# Patient Record
Sex: MALE | Race: WHITE | URBAN - METROPOLITAN AREA
[De-identification: names, ages, dates, MRNs, and addresses within clinical notes are randomized per-mention and may not be internally consistent; named-entity substitution may affect disease eponyms.]

---

## 2018-09-25 ENCOUNTER — OFFICE VISIT (OUTPATIENT)
Dept: PRIMARY CARE CLINIC | Age: 33
End: 2018-09-25
Payer: COMMERCIAL

## 2018-09-25 VITALS
WEIGHT: 166 LBS | RESPIRATION RATE: 18 BRPM | DIASTOLIC BLOOD PRESSURE: 91 MMHG | SYSTOLIC BLOOD PRESSURE: 137 MMHG | HEART RATE: 100 BPM | HEIGHT: 68 IN | OXYGEN SATURATION: 99 % | BODY MASS INDEX: 25.16 KG/M2

## 2018-09-25 DIAGNOSIS — I10 BENIGN ESSENTIAL HTN: ICD-10-CM

## 2018-09-25 DIAGNOSIS — F11.93 OPIOID WITHDRAWAL (HCC): ICD-10-CM

## 2018-09-25 DIAGNOSIS — F11.23 OPIOID DEPENDENCE WITH WITHDRAWAL (HCC): ICD-10-CM

## 2018-09-25 DIAGNOSIS — F33.9 EPISODE OF RECURRENT MAJOR DEPRESSIVE DISORDER, UNSPECIFIED DEPRESSION EPISODE SEVERITY (HCC): Primary | ICD-10-CM

## 2018-09-25 DIAGNOSIS — M54.50 CHRONIC MIDLINE LOW BACK PAIN WITHOUT SCIATICA: ICD-10-CM

## 2018-09-25 DIAGNOSIS — F43.10 PTSD (POST-TRAUMATIC STRESS DISORDER): ICD-10-CM

## 2018-09-25 DIAGNOSIS — G89.29 CHRONIC MIDLINE LOW BACK PAIN WITHOUT SCIATICA: ICD-10-CM

## 2018-09-25 PROBLEM — Z81.8: Status: ACTIVE | Noted: 2018-02-13

## 2018-09-25 PROBLEM — K52.9 ACUTE COLITIS: Status: ACTIVE | Noted: 2018-09-25

## 2018-09-25 PROBLEM — F13.10 SEDATIVE, HYPNOTIC OR ANXIOLYTIC ABUSE, CONTINUOUS (HCC): Status: ACTIVE | Noted: 2018-09-15

## 2018-09-25 PROBLEM — F43.12 CHRONIC POST-TRAUMATIC STRESS DISORDER: Status: ACTIVE | Noted: 2018-09-25

## 2018-09-25 PROBLEM — Z91.89 AT RISK FOR SEXUALLY TRANSMITTED DISEASE DUE TO PARTNER WITH HIV: Status: ACTIVE | Noted: 2018-02-13

## 2018-09-25 PROBLEM — Z83.3 FAMILY HISTORY OF DIABETES MELLITUS (DM): Status: ACTIVE | Noted: 2018-02-13

## 2018-09-25 PROBLEM — E87.6 HYPOKALEMIA: Status: ACTIVE | Noted: 2018-09-25

## 2018-09-25 PROBLEM — F41.1 GAD (GENERALIZED ANXIETY DISORDER): Status: ACTIVE | Noted: 2018-02-13

## 2018-09-25 PROBLEM — M25.512 CHRONIC LEFT SHOULDER PAIN: Status: ACTIVE | Noted: 2018-02-13

## 2018-09-25 PROBLEM — F17.213 CIGARETTE NICOTINE DEPENDENCE WITH WITHDRAWAL: Status: ACTIVE | Noted: 2018-09-15

## 2018-09-25 PROBLEM — F33.1 MAJOR DEPRESSIVE DISORDER, RECURRENT EPISODE, MODERATE (HCC): Status: ACTIVE | Noted: 2018-09-25

## 2018-09-25 PROCEDURE — 4004F PT TOBACCO SCREEN RCVD TLK: CPT | Performed by: INTERNAL MEDICINE

## 2018-09-25 PROCEDURE — G8427 DOCREV CUR MEDS BY ELIG CLIN: HCPCS | Performed by: INTERNAL MEDICINE

## 2018-09-25 PROCEDURE — G8419 CALC BMI OUT NRM PARAM NOF/U: HCPCS | Performed by: INTERNAL MEDICINE

## 2018-09-25 PROCEDURE — 99203 OFFICE O/P NEW LOW 30 MIN: CPT | Performed by: INTERNAL MEDICINE

## 2018-09-25 RX ORDER — LORATADINE 10 MG/1
10 CAPSULE, LIQUID FILLED ORAL DAILY
COMMUNITY
End: 2018-09-25 | Stop reason: SDUPTHER

## 2018-09-25 RX ORDER — BUSPIRONE HYDROCHLORIDE 15 MG/1
15 TABLET ORAL 3 TIMES DAILY
COMMUNITY
End: 2021-08-30

## 2018-09-25 RX ORDER — ACETAMINOPHEN 325 MG/1
650 TABLET ORAL EVERY 6 HOURS PRN
COMMUNITY
End: 2018-09-25 | Stop reason: SDUPTHER

## 2018-09-25 RX ORDER — PROMETHAZINE HYDROCHLORIDE 25 MG/1
12.5-25 TABLET ORAL EVERY 6 HOURS PRN
Qty: 12 TABLET | Refills: 0 | Status: SHIPPED | OUTPATIENT
Start: 2018-09-25 | End: 2018-10-02

## 2018-09-25 RX ORDER — IBUPROFEN 200 MG
200 TABLET ORAL EVERY 6 HOURS PRN
COMMUNITY
End: 2018-09-25 | Stop reason: SDUPTHER

## 2018-09-25 RX ORDER — IBUPROFEN 200 MG
200 TABLET ORAL EVERY 6 HOURS PRN
Qty: 120 TABLET | Refills: 2 | Status: SHIPPED | OUTPATIENT
Start: 2018-09-25 | End: 2021-08-30 | Stop reason: ALTCHOICE

## 2018-09-25 RX ORDER — EMTRICITABINE AND TENOFOVIR DISOPROXIL FUMARATE 200; 300 MG/1; MG/1
1 TABLET, FILM COATED ORAL DAILY
Qty: 30 TABLET | Refills: 5 | Status: SHIPPED | OUTPATIENT
Start: 2018-09-25 | End: 2021-08-30

## 2018-09-25 RX ORDER — ACETAMINOPHEN 325 MG/1
650 TABLET ORAL EVERY 6 HOURS PRN
Qty: 120 TABLET | Refills: 3 | Status: SHIPPED | OUTPATIENT
Start: 2018-09-25 | End: 2021-08-30

## 2018-09-25 RX ORDER — LORATADINE 10 MG/1
10 CAPSULE, LIQUID FILLED ORAL DAILY
Qty: 30 CAPSULE | Refills: 5 | Status: SHIPPED | OUTPATIENT
Start: 2018-09-25 | End: 2021-08-30

## 2018-09-25 RX ORDER — PANTOPRAZOLE SODIUM 40 MG/1
40 TABLET, DELAYED RELEASE ORAL DAILY
COMMUNITY
End: 2018-09-25 | Stop reason: SDUPTHER

## 2018-09-25 RX ORDER — TRAZODONE HYDROCHLORIDE 150 MG/1
300 TABLET ORAL
COMMUNITY
Start: 2018-03-13 | End: 2018-09-25 | Stop reason: SDUPTHER

## 2018-09-25 RX ORDER — HYDROXYZINE 50 MG/1
50 TABLET, FILM COATED ORAL 3 TIMES DAILY PRN
COMMUNITY
End: 2022-03-16 | Stop reason: SDUPTHER

## 2018-09-25 RX ORDER — TRAZODONE HYDROCHLORIDE 100 MG/1
100 TABLET ORAL NIGHTLY
COMMUNITY
End: 2021-08-30

## 2018-09-25 RX ORDER — HYDROXYZINE PAMOATE 25 MG/1
25-50 CAPSULE ORAL 3 TIMES DAILY PRN
Qty: 30 CAPSULE | Refills: 3 | Status: SHIPPED | OUTPATIENT
Start: 2018-09-25 | End: 2018-10-09

## 2018-09-25 RX ORDER — EMTRICITABINE AND TENOFOVIR DISOPROXIL FUMARATE 200; 300 MG/1; MG/1
1 TABLET, FILM COATED ORAL
COMMUNITY
Start: 2018-03-21 | End: 2018-09-25 | Stop reason: SDUPTHER

## 2018-09-25 RX ORDER — PANTOPRAZOLE SODIUM 40 MG/1
40 TABLET, DELAYED RELEASE ORAL DAILY
Qty: 30 TABLET | Refills: 5 | Status: SHIPPED | OUTPATIENT
Start: 2018-09-25 | End: 2021-08-30

## 2018-09-25 RX ORDER — TRAZODONE HYDROCHLORIDE 150 MG/1
300 TABLET ORAL NIGHTLY
Qty: 60 TABLET | Refills: 5 | Status: SHIPPED | OUTPATIENT
Start: 2018-09-25 | End: 2021-08-30

## 2018-09-25 ASSESSMENT — ENCOUNTER SYMPTOMS
RESPIRATORY NEGATIVE: 1
EYE DISCHARGE: 0
EYES NEGATIVE: 1

## 2018-09-25 NOTE — PROGRESS NOTES
lb (68 kg)   12/08/12 145 lb (65.8 kg)     BP Readings from Last 3 Encounters:   09/25/18 (!) 137/91   01/16/13 118/84   12/09/12 136/76         Physical Exam   Constitutional: He is oriented to person, place, and time. He appears well-developed and well-nourished. HENT:   Head: Normocephalic and atraumatic. Eyes: Pupils are equal, round, and reactive to light. Conjunctivae and EOM are normal.   Neck: Normal range of motion. Neck supple. Cardiovascular: Normal rate, regular rhythm and normal heart sounds. Pulmonary/Chest: Effort normal and breath sounds normal.   Musculoskeletal: Normal range of motion. Neurological: He is alert and oriented to person, place, and time. Skin: Skin is warm and dry. Psychiatric: He has a normal mood and affect. His behavior is normal. Judgment and thought content normal.       Lab Review   No visits with results within 6 Month(s) from this visit.    Latest known visit with results is:   Admission on 01/16/2013, Discharged on 01/16/2013   Component Date Value    WBC 01/16/2013 9.9     RBC 01/16/2013 5.34     Hemoglobin 01/16/2013 16.0     Hematocrit 01/16/2013 47.5     MCV 01/16/2013 88.9     MCH 01/16/2013 30.0     MCHC 01/16/2013 33.8     RDW 01/16/2013 11.9     Platelets 09/83/7716 295     MPV 01/16/2013 8.2     Segs Relative 01/16/2013 74.8*    Lymphocytes % 01/16/2013 18.2*    Monocytes % 01/16/2013 5.9     Eosinophils % 01/16/2013 0.3     Basophils % 01/16/2013 0.8     GRANULOCYTE ABSOLUTE COU* 01/16/2013 7.4     Lymphocytes # 01/16/2013 1.8     Monocytes # 01/16/2013 0.6     Eosinophils # 01/16/2013 0.0     Basophils # 01/16/2013 0.1     Differential Type 01/16/2013 Auto     Sodium 01/16/2013 140     Potassium 01/16/2013 3.7     Chloride 01/16/2013 109     CO2 01/16/2013 27     Glucose 01/16/2013 91     BUN 01/16/2013 14     CREATININE 01/16/2013 0.9     Calcium 01/16/2013 10.2     GFR, Estimated 01/16/2013 >60     GFR Est,

## 2018-09-25 NOTE — PATIENT INSTRUCTIONS
Use the medications as directed. I am referring you to psychiatry. I am referring you to pain management. You can resume the trazodone 150 mg 2 pills in the evening for sleep. Phenergan for nausea. Continue your other occasions as before. Follow-up with me in 3 months.

## 2021-08-30 ENCOUNTER — OFFICE VISIT (OUTPATIENT)
Dept: PRIMARY CARE CLINIC | Age: 36
End: 2021-08-30
Payer: COMMERCIAL

## 2021-08-30 VITALS
HEART RATE: 84 BPM | DIASTOLIC BLOOD PRESSURE: 88 MMHG | TEMPERATURE: 98.5 F | HEIGHT: 68 IN | WEIGHT: 184.4 LBS | RESPIRATION RATE: 17 BRPM | BODY MASS INDEX: 27.95 KG/M2 | OXYGEN SATURATION: 97 % | SYSTOLIC BLOOD PRESSURE: 122 MMHG

## 2021-08-30 DIAGNOSIS — R00.2 PALPITATION: Primary | ICD-10-CM

## 2021-08-30 DIAGNOSIS — R63.5 WEIGHT GAIN: ICD-10-CM

## 2021-08-30 DIAGNOSIS — F17.219 CIGARETTE NICOTINE DEPENDENCE WITH NICOTINE-INDUCED DISORDER: ICD-10-CM

## 2021-08-30 DIAGNOSIS — F33.1 MAJOR DEPRESSIVE DISORDER, RECURRENT EPISODE, MODERATE (HCC): ICD-10-CM

## 2021-08-30 DIAGNOSIS — R07.2 PRECORDIAL PAIN: ICD-10-CM

## 2021-08-30 DIAGNOSIS — F41.1 GAD (GENERALIZED ANXIETY DISORDER): ICD-10-CM

## 2021-08-30 DIAGNOSIS — R30.0 DYSURIA: ICD-10-CM

## 2021-08-30 PROBLEM — K52.9 ACUTE COLITIS: Status: RESOLVED | Noted: 2018-09-25 | Resolved: 2021-08-30

## 2021-08-30 LAB
BILIRUBIN, POC: NORMAL
BLOOD URINE, POC: NORMAL
CLARITY, POC: NORMAL
COLOR, POC: NORMAL
GLUCOSE URINE, POC: NORMAL
KETONES, POC: NORMAL
LEUKOCYTE EST, POC: NORMAL
NITRITE, POC: NORMAL
PH, POC: 5.5
PROTEIN, POC: NORMAL
SPECIFIC GRAVITY, POC: 1.02
UROBILINOGEN, POC: NORMAL

## 2021-08-30 PROCEDURE — 93000 ELECTROCARDIOGRAM COMPLETE: CPT | Performed by: INTERNAL MEDICINE

## 2021-08-30 PROCEDURE — G8427 DOCREV CUR MEDS BY ELIG CLIN: HCPCS | Performed by: INTERNAL MEDICINE

## 2021-08-30 PROCEDURE — 99204 OFFICE O/P NEW MOD 45 MIN: CPT | Performed by: INTERNAL MEDICINE

## 2021-08-30 PROCEDURE — 81002 URINALYSIS NONAUTO W/O SCOPE: CPT | Performed by: INTERNAL MEDICINE

## 2021-08-30 PROCEDURE — G8419 CALC BMI OUT NRM PARAM NOF/U: HCPCS | Performed by: INTERNAL MEDICINE

## 2021-08-30 PROCEDURE — 4004F PT TOBACCO SCREEN RCVD TLK: CPT | Performed by: INTERNAL MEDICINE

## 2021-08-30 RX ORDER — OMEPRAZOLE 20 MG/1
20 CAPSULE, DELAYED RELEASE ORAL
Qty: 30 CAPSULE | Refills: 0 | Status: SHIPPED | OUTPATIENT
Start: 2021-08-30 | End: 2021-10-07

## 2021-08-30 RX ORDER — ZOLPIDEM TARTRATE 10 MG/1
TABLET ORAL NIGHTLY
COMMUNITY
Start: 2021-08-20 | End: 2022-03-17

## 2021-08-30 RX ORDER — PROPRANOLOL HYDROCHLORIDE 20 MG/1
TABLET ORAL
COMMUNITY
End: 2021-09-15 | Stop reason: ALTCHOICE

## 2021-08-30 RX ORDER — HYDROXYZINE PAMOATE 50 MG/1
CAPSULE ORAL
COMMUNITY
Start: 2021-08-02 | End: 2021-08-30

## 2021-08-30 RX ORDER — ESCITALOPRAM OXALATE 10 MG/1
TABLET ORAL
COMMUNITY
Start: 2021-08-29 | End: 2021-11-10 | Stop reason: ALTCHOICE

## 2021-08-30 RX ORDER — BUPRENORPHINE HYDROCHLORIDE AND NALOXONE HYDROCHLORIDE DIHYDRATE 8; 2 MG/1; MG/1
10 TABLET SUBLINGUAL
COMMUNITY
Start: 2021-08-19

## 2021-08-30 ASSESSMENT — ENCOUNTER SYMPTOMS
RHINORRHEA: 0
SINUS PRESSURE: 0
WHEEZING: 0
BLOOD IN STOOL: 0
COUGH: 0
SORE THROAT: 0
CHEST TIGHTNESS: 0
SINUS PAIN: 0
NAUSEA: 0
EYE PAIN: 0
BACK PAIN: 1
TROUBLE SWALLOWING: 0
VOMITING: 0
EYE DISCHARGE: 0
ABDOMINAL PAIN: 0
SHORTNESS OF BREATH: 1

## 2021-08-30 NOTE — PROGRESS NOTES
2021    Juan Hwang (: 1985) is a 39 y.o. male, here for evaluation of the following medical concerns:    Chief Complaint   Patient presents with    New Patient     depression and addiction, trouble getting out of bed. heart rate fast.        Nothing eaten till 4 pm.    10 pm went to sleep.woke up at 8-8.30.---walked dog--laid bed    Yesterday--cabbage/sausage/peppers    Cookies--water    Decaf coffee/tea    Suboxone since March--took 12 mg ---trying to decrease to 6-8 mg    Depression    Taking medications regularly. Pursue care-since  CNP--Suboxone--Mental Health CNP--helping sleep and depression. on/off mental health meds---not working. Feeling difficult to wake up. Feeling better with less symptoms of depression. Without any side effects from medications. Reminded to keep regular exercise program.  Reminded to keep self relaxation with music or meditation etc.      Smoking    The [address] for Disease Control and Prevention states:    Tobacco use harms every organ of the body which leads to disease and disability. Tobacco use causes cancer, heart disease, stroke, and lung diseases (including emphysema, bronchitis, and chronic airway obstruction). strongly encouraged  to quit using tobacco.   You can decrease your cigarette use by half every 1-2 weeks to quit smoking in 4-8 weeks or so. You can use off and on nicotine gum or lozenges to help quit smoking. Gastroesophageal Reflux Disease:    Watching gastroesophageal reflux diet. Not Taking medications as prescribed without any side effects. Not Working on trying not to eat for 3 to 4 hours before lying down. Palpitations   This is a new problem. The current episode started more than 1 month ago. The problem occurs every several days. The problem has been gradually worsening. The symptoms are aggravated by stress. Associated symptoms include chest fullness, chest pain and shortness of breath.  Pertinent negatives include no coughing, fever, malaise/fatigue, nausea, numbness, vomiting or weakness. He has tried beta blockers for the symptoms. The treatment provided mild relief. Risk factors include smoking/tobacco exposure. His past medical history is significant for drug use. Review of Systems   Constitutional: Positive for fatigue. Negative for appetite change, chills, fever, malaise/fatigue and unexpected weight change. HENT: Negative for congestion, ear discharge, ear pain, nosebleeds, rhinorrhea, sinus pressure, sinus pain, sore throat and trouble swallowing. Eyes: Negative for pain and discharge. Respiratory: Positive for shortness of breath. Negative for cough, chest tightness and wheezing. Cardiovascular: Positive for chest pain and palpitations. Negative for leg swelling. Gastrointestinal: Negative for abdominal pain, blood in stool, nausea and vomiting. Endocrine: Negative for polydipsia and polyphagia. Genitourinary: Negative for difficulty urinating, enuresis, flank pain and hematuria. Musculoskeletal: Positive for back pain. Negative for myalgias. Skin: Negative for rash. Neurological: Negative for facial asymmetry, weakness, light-headedness, numbness and headaches. Psychiatric/Behavioral: Negative for confusion. Current Outpatient Medications on File Prior to Visit   Medication Sig Dispense Refill    buprenorphine-naloxone (SUBOXONE) 8-2 MG SUBL SL tablet       escitalopram (LEXAPRO) 10 MG tablet       propranolol (INDERAL) 20 MG tablet propranolol 20 mg tablet      zolpidem (AMBIEN) 10 MG tablet       hydrOXYzine (ATARAX) 50 MG tablet Take 50 mg by mouth 3 times daily as needed for Itching       No current facility-administered medications on file prior to visit.       No Known Allergies  Past Medical History:   Diagnosis Date    Anxiety     Benign essential HTN 12/18/2015    Episode of recurrent major depressive disorder ([facility] [address] 9/25/2018    Midline low back pain without sciatica 2016    Panic anxiety syndrome     Sedative, hypnotic or anxiolytic abuse, continuous ([facility] [address] 9/15/2018    Sleep disturbances      Past Surgical History:   Procedure Laterality Date    APPENDECTOMY      before 11years of age   [employer] BACK SURGERY      removed fragments of tailbone    TONSILLECTOMY      preteen      Social History     Tobacco Use    Smoking status: Current Every Day Smoker     Packs/day: 1.00     Years: 14.00     Pack years: 14.00    Smokeless tobacco: Never Used   Substance Use Topics    Alcohol use: No      Family History   Problem Relation Age of Onset    Thyroid Disease Mother     Stroke Mother     Hypertension Mother     Other Father     Diabetes Father          at 55-Oxycontin OD        Vitals:    21 1523   BP: 122/88   Site: Left Upper Arm   Position: Sitting   Cuff Size: Medium Adult   Pulse: 84   Resp: 17   Temp: 98.5 °F (36.9 °C)   SpO2: 97%   Weight: 184 lb 6.4 oz (83.6 kg)   Height: 5' 7.5\" (1.715 m)     Estimated body mass index is 28.45 kg/m² as calculated from the following:    Height as of this encounter: 5' 7.5\" (1.715 m). Weight as of this encounter: 184 lb 6.4 oz (83.6 kg). Physical Exam  Vitals and nursing note reviewed. Constitutional:       General: He is not in acute distress. HENT:      Head: Normocephalic and atraumatic. Right Ear: External ear normal.      Left Ear: External ear normal.   Eyes:      General: Lids are normal.      Conjunctiva/sclera: Conjunctivae normal.      Pupils: Pupils are equal, round, and reactive to light. Neck:      Thyroid: No thyromegaly. Vascular: No JVD. Trachea: No tracheal deviation. Cardiovascular:      Rate and Rhythm: Normal rate and regular rhythm. Heart sounds: Normal heart sounds. No gallop. Pulmonary:      Effort: Pulmonary effort is normal. No respiratory distress. Breath sounds: Normal breath sounds. No wheezing or rales.    Abdominal:      General: Bowel sounds are normal.      Palpations: Abdomen is soft. There is no mass. Tenderness: There is no abdominal tenderness. Musculoskeletal:         General: No tenderness. Cervical back: Neck supple. Comments: No leg edema or calf tenderness   Lymphadenopathy:      Cervical: No cervical adenopathy. Skin:     General: Skin is warm and dry. Findings: No rash. Neurological:      General: No focal deficit present. Mental Status: He is alert and oriented to person, place, and time. Cranial Nerves: No cranial nerve deficit. Sensory: No sensory deficit. Psychiatric:         Attention and Perception: Attention and perception normal.         Mood and Affect: Mood is depressed. Speech: Speech normal.         Behavior: Behavior normal.         Thought Content: Thought content normal.         ASSESSMENT/PLAN:  1. Palpitation    - Comprehensive Metabolic Panel; Future  - TSH with Reflex; Future  - T4, Free; Future  - EKG 12 Lead--Sinus --nonsp QRS prolonged  - POCT Urinalysis no Micro  - Holter Monitor 48 Hour; Future  - Bg Buchanan MD, Cardiology, [place] [id number]    2. Precordial pain    - Troponin; Future  - EKG 12 Art Luis MD, Cardiology, [facility]  - omeprazole (PRILOSEC) 20 MG delayed release capsule; Take 1 capsule by mouth every morning (before breakfast)  Dispense: 30 capsule; Refill: 0    3. Major depressive disorder, recurrent episode, moderate ([address]    - External Referral to Psychiatry    4. Cigarette nicotine dependence with nicotine-induced disorder  10 cigs a day  5. NENITA (generalized anxiety disorder)  No caffeine    6. Weight gain  Lose wt    7. Dysuria    - CBC Auto Differential; Future  - POCT Urinalysis no Micro      Return in about 1 week (around 9/6/2021) for labs etc.     Patient Instructions   Blood work tomorrow here . Headspace SARAH--meditation.     Hold off propranolol on the day of the Holter monitor to see the heart rate. 64 oz water from 9am to 6 pm.    No caffeine diet--wean off. Extensive counseling done to keep avoiding spicy, acidic tomato based foods or fatty foods like chocolate, citrus fruits (oranges, grapefruit etc.) and fruit juices. Limit intake of coffee, tea, alcohol and Bambi to one a day after food only. Watch your weight. Being overweight increases intra-abdominal pressure - which can aggravate heartburn or reflux. Don't gorge yourself at meal time. Eat smaller meals. Wait for 2 hours for exercise after eating. No eating or drinking (not even water) for 3-4 hours before lying down. May elevate head of bed with blocks , if needed. If smoking-stop or at least cut down on smoking. Decrease ambien to 5mg     Decrease psych meds to half and see psychiatrist    Regular exercise--1 hour brisk walk     Make Sure to keep same dose of psychiatric medication for 2 months to see the peak benefit. Strongly encouraged to quit using tobacco.   You can decrease your cigarette use by half every 1-2 weeks to quit smoking in 4-8 weeks or so. You can use off and on nicotine gum or lozenges to help quit smoking. The [address] for Disease Control and Prevention states:    Tobacco use harms every organ of the body which leads to disease and disability.  Tobacco use causes cancer, heart disease, stroke, and lung diseases (including emphysema, bronchitis, and chronic airway obstruction). We have many other ways to help you quit using tobacco.     We suggest this website:  www.smokefree. gov   You might also like this cell phone text message program.  Text message charges apply on your cell phone plan. Text the word QUIT to TRAVIS to get started.  This program offers free telephone counseling. Dial 2-800-QUIT-Now    Discussed use, benefit, and side effects of prescribed medications. Barriers to compliance discussed. All patient questions answered. Pt voiced understanding.    IF YOU NEED A PRESCRIPTION REFILL, THEN PLEASE GIVE US THREE WORKING DAYS TO REFILL A PRESCRIPTION. Office Hours to Answer Questions--Monday thru Thursday --9.00 AM to 4.00 PM    Please get all OVER DUE VACCINATIONS done at the pharmacy or health department as soon as possible. Electronically signed by Carly Gomez MD on 8/30/2021 at 4:27 PM     This dictation was generated by voice recognition computer software. Although all attempts are made to edit the dictation for accuracy, there may be errors in the transcription that are not intended.

## 2021-08-30 NOTE — PATIENT INSTRUCTIONS
Blood work tomorrow here . Headspace SARAH--meditation. Hold off propranolol on the day of the Holter monitor to see the heart rate. 64 oz water from 9am to 6 pm.    No caffeine diet--wean off. Extensive counseling done to keep avoiding spicy, acidic tomato based foods or fatty foods like chocolate, citrus fruits (oranges, grapefruit etc.) and fruit juices. Limit intake of coffee, tea, alcohol and Bambi to one a day after food only. Watch your weight. Being overweight increases intra-abdominal pressure - which can aggravate heartburn or reflux. Don't gorge yourself at meal time. Eat smaller meals. Wait for 2 hours for exercise after eating. No eating or drinking (not even water) for 3-4 hours before lying down. May elevate head of bed with blocks , if needed. If smoking-stop or at least cut down on smoking. Decrease ambien to 5mg     Decrease psych meds to half and see psychiatrist    Regular exercise--1 hour brisk walk     Make Sure to keep same dose of psychiatric medication for 2 months to see the peak benefit. Strongly encouraged to quit using tobacco.   You can decrease your cigarette use by half every 1-2 weeks to quit smoking in 4-8 weeks or so. You can use off and on nicotine gum or lozenges to help quit smoking. The [address] for Disease Control and Prevention states:    Tobacco use harms every organ of the body which leads to disease and disability.  Tobacco use causes cancer, heart disease, stroke, and lung diseases (including emphysema, bronchitis, and chronic airway obstruction). We have many other ways to help you quit using tobacco.     We suggest this website:  www.smokefree. gov   You might also like this cell phone text message program.  Text message charges apply on your cell phone plan. Text the word QUIT to IQUIT to get started.  This program offers free telephone counseling.   Dial 1-800-QUIT-Now    Discussed use, benefit, and side effects of prescribed

## 2021-09-01 ENCOUNTER — OFFICE VISIT (OUTPATIENT)
Dept: CARDIOLOGY CLINIC | Age: 36
End: 2021-09-01
Payer: COMMERCIAL

## 2021-09-01 VITALS
HEART RATE: 84 BPM | DIASTOLIC BLOOD PRESSURE: 74 MMHG | WEIGHT: 186 LBS | BODY MASS INDEX: 28.19 KG/M2 | SYSTOLIC BLOOD PRESSURE: 118 MMHG | HEIGHT: 68 IN | OXYGEN SATURATION: 97 %

## 2021-09-01 DIAGNOSIS — R00.2 PALPITATION: Primary | ICD-10-CM

## 2021-09-01 DIAGNOSIS — R07.9 CHEST PAIN, UNSPECIFIED TYPE: ICD-10-CM

## 2021-09-01 DIAGNOSIS — R06.02 SOB (SHORTNESS OF BREATH): ICD-10-CM

## 2021-09-01 PROCEDURE — 93000 ELECTROCARDIOGRAM COMPLETE: CPT | Performed by: INTERNAL MEDICINE

## 2021-09-01 PROCEDURE — 99203 OFFICE O/P NEW LOW 30 MIN: CPT | Performed by: INTERNAL MEDICINE

## 2021-09-01 PROCEDURE — 4004F PT TOBACCO SCREEN RCVD TLK: CPT | Performed by: INTERNAL MEDICINE

## 2021-09-01 PROCEDURE — G8427 DOCREV CUR MEDS BY ELIG CLIN: HCPCS | Performed by: INTERNAL MEDICINE

## 2021-09-01 PROCEDURE — 93242 EXT ECG>48HR<7D RECORDING: CPT | Performed by: INTERNAL MEDICINE

## 2021-09-01 PROCEDURE — G8419 CALC BMI OUT NRM PARAM NOF/U: HCPCS | Performed by: INTERNAL MEDICINE

## 2021-09-01 NOTE — PROGRESS NOTES
[address]   Cardiac Consultation    Referring Provider:  Owen Tellez MD     Chief Complaint   Patient presents with    New Patient     Referral from Dr. Rani Mann Dx palpitations, precordial pain    Hypertension        History of Present Illness:  Linette López is a 39 y.o. male here as a new consult for palpitations. He states he has taken propranolol \"for a long time\", has been taking for his heart rate and BP controll. He does not feel this has been working lately. He reports he will have occasional episodes of chest pain accompanied with elevated heart rate. Had instance where this lasted over a week. Has had no prior cardiac testing beside EKG. He reports issues with palpitations and hypertension for 3-4 years but worsening in the last few months. Past Medical History:   has a past medical history of Anxiety, Benign essential HTN, Episode of recurrent major depressive disorder ([address] Midline low back pain without sciatica, Panic anxiety syndrome, Sedative, hypnotic or anxiolytic abuse, continuous ([address] and Sleep disturbances. Surgical History:   has a past surgical history that includes Appendectomy; Tonsillectomy; and back surgery. Social History:   reports that he has been smoking. He has a 14.00 pack-year smoking history. He has never used smokeless tobacco. He reports that he does not drink alcohol and does not use drugs. Family History:  family history includes Diabetes in his father; Hypertension in his mother; Other in his father; Stroke in his mother; Thyroid Disease in his mother. Home Medications:  Prior to Admission medications    Medication Sig Start Date End Date Taking?  Authorizing Provider   buprenorphine-naloxone (SUBOXONE) 8-2 MG SUBL SL tablet  8/19/21  Yes Historical Provider, MD   escitalopram (LEXAPRO) 10 MG tablet  8/29/21  Yes Historical Provider, MD   propranolol (INDERAL) 20 MG tablet propranolol 20 mg tablet   Yes Historical Provider, MD   zolpidem (AMBIEN) 10 MG tablet  8/20/21  Yes Historical Provider, MD   omeprazole (PRILOSEC) 20 MG delayed release capsule Take 1 capsule by mouth every morning (before breakfast) 8/30/21  Yes Bryan Lucio MD   hydrOXYzine (ATARAX) 50 MG tablet Take 50 mg by mouth 3 times daily as needed for Itching   Yes Historical Provider, MD        Allergies:  Patient has no known allergies. Review of Systems:   · Constitutional: there has been no unanticipated weight loss. There's been no change in energy level, sleep pattern, or activity level. · Eyes: No visual changes or diplopia. No scleral icterus. · ENT: No Headaches, hearing loss or vertigo. No mouth sores or sore throat. · Cardiovascular: Reviewed in HPI  · Respiratory: No cough or wheezing, no sputum production. No hematemesis. · Gastrointestinal: No abdominal pain, appetite loss, blood in stools. No change in bowel or bladder habits. · Genitourinary: No dysuria, trouble voiding, or hematuria. · Musculoskeletal:  No gait disturbance, weakness or joint complaints. · Integumentary: No rash or pruritis. · Neurological: No headache, diplopia, change in muscle strength, numbness or tingling. No change in gait, balance, coordination, mood, affect, memory, mentation, behavior. · Psychiatric: No anxiety, no depression. · Endocrine: No malaise, fatigue or temperature intolerance. No excessive thirst, fluid intake, or urination. No tremor. · Hematologic/Lymphatic: No abnormal bruising or bleeding, blood clots or swollen lymph nodes. · Allergic/Immunologic: No nasal congestion or hives.     Physical Examination:    Vitals:    09/01/21 1022   BP: 118/74   Pulse: 84   SpO2: 97%        Wt Readings from Last 1 Encounters:   09/01/21 186 lb (84.4 kg)       Constitutional and General Appearance: NAD   Skin:good turgor,intact without lesions  HEENT: EOMI ,normal  Neck:no JVD    Respiratory:  · Normal excursion and expansion without use of accessory muscles  · Resp Auscultation: Normal breath sounds without dullness  Cardiovascular:  · The apical impulses not displaced  · Heart tones are crisp and normal  · Cervical veins are not engorged  · The carotid upstroke is normal in amplitude and contour without delay or bruit  · Peripheral pulses are symmetrical and full  · There is no clubbing, cyanosis of the extremities. · No edema  · Femoral Arteries: 2+ and equal  · Pedal Pulses: 2+ and equal   Abdomen:  · No masses or tenderness  · Liver/Spleen: No Abnormalities Noted  Neurological/Psychiatric:  · Alert and oriented in all spheres  · Moves all extremities well  · Exhibits normal gait balance and coordination  · No abnormalities of mood, affect, memory, mentation, or behavior are noted      Assessment/Plan:     Palpitation/Chest pain- STOP propranolol (INDERAL) 20 MG tablet   Start Metoprolol  (lopressor) x2 daily - take this while wearing the monitor       7 day cardiac monitor        Plain stress test and Echo      Hypertension- controlled-Blood pressure 118/74, pulse 84, height 5' 7.5\" (1.715 m), weight 186 lb (84.4 kg), SpO2 97 %. Plan:    Cardiac test and lab results personally reviewed by me during this office visit and discussed. STOP propranolol (INDERAL) 20 MG tablet    Start Metoprolol  (lopressor) x2 daily - take this while wearing the monitor         7 day cardiac monitor         Plain stress test and Echo   Continue risk factor modifications. Call for any change in symptoms, call to report any changes in shortness of breath or development of chest pain with activity. Return for regular follow up based on testing results. I appreciate the opportunity of cooperating in the care of this individual.    Zahra Silva. Shena Royal M.D., [facility]    Patient's problem list, medications, allergies, past medical, surgical, social and family histories were reviewed and updated as appropriate. Scribe's attestation:  This note was scribed in the presence of Dr. Kimberly Shen MD, by Stefany Bell RN. The scribe's documentation has been prepared under my direction and personally reviewed by me in its entirety. I confirm that the note above accurately reflects all work, treatment, procedures, and medical decision making performed by me.

## 2021-09-01 NOTE — PROGRESS NOTES
7 Day Bardy placed on the patient here in clinic today . Reviewed proper care and instructions with the patient .      SN : [id number]

## 2021-09-10 PROCEDURE — 93244 EXT ECG>48HR<7D REV&INTERPJ: CPT | Performed by: INTERNAL MEDICINE

## 2021-09-15 ENCOUNTER — OFFICE VISIT (OUTPATIENT)
Dept: PRIMARY CARE CLINIC | Age: 36
End: 2021-09-15
Payer: COMMERCIAL

## 2021-09-15 VITALS
HEART RATE: 80 BPM | TEMPERATURE: 97 F | BODY MASS INDEX: 28.61 KG/M2 | SYSTOLIC BLOOD PRESSURE: 118 MMHG | WEIGHT: 188.8 LBS | DIASTOLIC BLOOD PRESSURE: 80 MMHG | HEIGHT: 68 IN | OXYGEN SATURATION: 98 % | RESPIRATION RATE: 16 BRPM

## 2021-09-15 DIAGNOSIS — K21.00 GASTROESOPHAGEAL REFLUX DISEASE WITH ESOPHAGITIS WITHOUT HEMORRHAGE: ICD-10-CM

## 2021-09-15 DIAGNOSIS — Z11.59 NEED FOR HEPATITIS C SCREENING TEST: ICD-10-CM

## 2021-09-15 DIAGNOSIS — F17.219 CIGARETTE NICOTINE DEPENDENCE WITH NICOTINE-INDUCED DISORDER: ICD-10-CM

## 2021-09-15 DIAGNOSIS — R73.9 HYPERGLYCEMIA: ICD-10-CM

## 2021-09-15 DIAGNOSIS — Z11.4 SCREENING FOR HIV (HUMAN IMMUNODEFICIENCY VIRUS): ICD-10-CM

## 2021-09-15 DIAGNOSIS — I10 BENIGN ESSENTIAL HTN: Primary | ICD-10-CM

## 2021-09-15 PROCEDURE — G8419 CALC BMI OUT NRM PARAM NOF/U: HCPCS | Performed by: INTERNAL MEDICINE

## 2021-09-15 PROCEDURE — 4004F PT TOBACCO SCREEN RCVD TLK: CPT | Performed by: INTERNAL MEDICINE

## 2021-09-15 PROCEDURE — G8427 DOCREV CUR MEDS BY ELIG CLIN: HCPCS | Performed by: INTERNAL MEDICINE

## 2021-09-15 PROCEDURE — 99214 OFFICE O/P EST MOD 30 MIN: CPT | Performed by: INTERNAL MEDICINE

## 2021-09-15 ASSESSMENT — ENCOUNTER SYMPTOMS
NAUSEA: 0
CHEST TIGHTNESS: 0
SINUS PRESSURE: 0
EYE PAIN: 0
TROUBLE SWALLOWING: 0
SORE THROAT: 0
EYE DISCHARGE: 0
WHEEZING: 0
COUGH: 0
SHORTNESS OF BREATH: 0
ABDOMINAL PAIN: 0
SINUS PAIN: 0
VOMITING: 0
BLOOD IN STOOL: 0
RHINORRHEA: 0

## 2021-09-15 NOTE — PATIENT INSTRUCTIONS
Blood work tomorrow. Keep cardio/psych f/u. No caffeine. All meds after food. Extensive counseling done to keep avoiding spicy, acidic tomato based foods or fatty foods like chocolate, citrus fruits (oranges, grapefruit etc.) and fruit juices. Limit intake of coffee, tea, alcohol and Bambi to one a day after food only. Watch your weight. Being overweight increases intra-abdominal pressure - which can aggravate heartburn or reflux. Don't gorge yourself at meal time. Eat smaller meals. Wait for 2 hours for exercise after eating. No eating or drinking (not even water) for 3-4 hours before lying down. May elevate head of bed with blocks , if needed. If smoking-stop or at least cut down on smoking. Strongly encouraged to quit using tobacco.   You can decrease your cigarette use by half every 1-2 weeks to quit smoking in 4-8 weeks or so. You can use off and on nicotine gum or lozenges to help quit smoking. The [address] for Disease Control and Prevention states:    Tobacco use harms every organ of the body which leads to disease and disability.  Tobacco use causes cancer, heart disease, stroke, and lung diseases (including emphysema, bronchitis, and chronic airway obstruction). We have many other ways to help you quit using tobacco.     We suggest this website:  www.smokefree. gov   You might also like this cell phone text message program.  Text message charges apply on your cell phone plan. Text the word QUIT to IQUIT to get started.  This program offers free telephone counseling. Dial 4-800-QUIT-Now    Discussed use, benefit, and side effects of prescribed medications. Barriers to compliance discussed. All patient questions answered. Pt voiced understanding. IF YOU NEED A PRESCRIPTION REFILL, THEN PLEASE GIVE US THREE WORKING DAYS TO REFILL A PRESCRIPTION.   Office Hours to Answer Questions--Tuesday thru Friday --9.00 AM to 4.00 PM    Please get all OVER DUE VACCINATIONS done at the pharmacy or health department as soon as possible.

## 2021-09-15 NOTE — PROGRESS NOTES
9/15/2021     Erik Gale (: 1985) is a 39 y.o. male, here for evaluation of the following medical concerns:    Chief Complaint   Patient presents with    Results        holter x 7 days.-He has stress test and echo scheduled.-palpitations still same--last night --chest pains--8 pm--ate dinner---4 pm--sweet/sour chicken- no rice--drink water/ apple juice  Drink decaf coffee in Am/chemomile tea at night to sleep. Suboxone 12mg a day-he wants to reduce that and he is going to be discussing that with center and also seeing psychiatrist to adjust psych medication. Labs explained. Gastroesophageal Reflux Disease:    Watching gastroesophageal reflux diet. has esophageal spasm at times w hiatal hernia. Taking medications as prescribed without any side effects. Working on trying to lose weight. Working on trying not to eat for 3 to 4 hours before lying down. Smoking    The [address] for Disease Control and Prevention states:    Tobacco use harms every organ of the body which leads to disease and disability. Tobacco use causes cancer, heart disease, stroke, and lung diseases (including emphysema, bronchitis, and chronic airway obstruction). strongly encouraged  to quit using tobacco.   You can decrease your cigarette use by half every 1-2 weeks to quit smoking in 4-8 weeks or so. You can use off and on nicotine gum or lozenges to help quit smoking. Insomnia:     Reminded to avoid any caffeine for 10 to 12 hours before sleeping. Avoid tea coffee pop cola or chocolate. Keep proper sleep hygiene as explained. Taking sleeping medications regularly as recommended. Not noticing any side effects from medication. No falls or drowsiness next day. Review of Systems   Constitutional: Negative for appetite change, chills, fever and unexpected weight change.    HENT: Negative for congestion, ear discharge, ear pain, nosebleeds, rhinorrhea, sinus pressure, sinus pain, sore throat and trouble swallowing. Eyes: Negative for pain and discharge. Respiratory: Negative for cough, chest tightness, shortness of breath and wheezing. Cardiovascular: Positive for chest pain and palpitations. Negative for leg swelling. Gastrointestinal: Negative for abdominal pain, blood in stool, nausea and vomiting. Endocrine: Negative for polydipsia and polyphagia. Genitourinary: Negative for difficulty urinating, enuresis, flank pain and hematuria. Musculoskeletal: Negative for myalgias. Skin: Negative for rash. Neurological: Negative for facial asymmetry, weakness, light-headedness, numbness and headaches. Psychiatric/Behavioral: Negative for confusion. Current Outpatient Medications on File Prior to Visit   Medication Sig Dispense Refill    metoprolol tartrate (LOPRESSOR) 25 MG tablet Take 1 tablet by mouth 2 times daily 180 tablet 1    buprenorphine-naloxone (SUBOXONE) 8-2 MG SUBL SL tablet       escitalopram (LEXAPRO) 10 MG tablet       zolpidem (AMBIEN) 10 MG tablet       omeprazole (PRILOSEC) 20 MG delayed release capsule Take 1 capsule by mouth every morning (before breakfast) 30 capsule 0    hydrOXYzine (ATARAX) 50 MG tablet Take 50 mg by mouth 3 times daily as needed for Itching       No current facility-administered medications on file prior to visit.       Past Medical History:   Diagnosis Date    Anxiety     Benign essential HTN 12/18/2015    Episode of recurrent major depressive disorder ([facility] [address] 9/25/2018    Midline low back pain without sciatica 9/28/2016    Panic anxiety syndrome     Sedative, hypnotic or anxiolytic abuse, continuous ([address] 9/15/2018    Sleep disturbances       Social History     Tobacco Use    Smoking status: Current Every Day Smoker     Packs/day: 1.00     Years: 14.00     Pack years: 14.00    Smokeless tobacco: Never Used   Substance Use Topics    Alcohol use: No      Family History   Problem Relation Age of Onset    Thyroid Disease Mother    She Her Stroke Mother     Hypertension Mother     Other Father     Diabetes Father          at 55-Oxycontin OD        Vitals:    09/15/21 1742   BP: 118/80   Site: Left Upper Arm   Position: Sitting   Cuff Size: Large Adult   Pulse: 80   Resp: 16   Temp: 97 °F (36.1 °C)   SpO2: 98%   Weight: 188 lb 12.8 oz (85.6 kg)   Height: 5' 7.5\" (1.715 m)     Estimated body mass index is 29.13 kg/m² as calculated from the following:    Height as of this encounter: 5' 7.5\" (1.715 m). Weight as of this encounter: 188 lb 12.8 oz (85.6 kg). Physical Exam  Vitals and nursing note reviewed. Constitutional:       General: He is not in acute distress. HENT:      Head: Normocephalic and atraumatic. Right Ear: External ear normal.      Left Ear: External ear normal.   Eyes:      General: Lids are normal.      Conjunctiva/sclera: Conjunctivae normal.      Pupils: Pupils are equal, round, and reactive to light. Neck:      Thyroid: No thyromegaly. Vascular: No JVD. Trachea: No tracheal deviation. Cardiovascular:      Rate and Rhythm: Normal rate and regular rhythm. Heart sounds: Normal heart sounds. No gallop. Pulmonary:      Effort: Pulmonary effort is normal. No respiratory distress. Breath sounds: Normal breath sounds. No wheezing or rales. Abdominal:      General: Bowel sounds are normal.      Palpations: Abdomen is soft. There is no mass. Tenderness: There is no abdominal tenderness. Musculoskeletal:         General: No tenderness. Cervical back: Neck supple. Comments: No leg edema or calf tenderness   Lymphadenopathy:      Cervical: No cervical adenopathy. Skin:     General: Skin is warm and dry. Findings: No rash. Neurological:      Mental Status: He is alert and oriented to person, place, and time. Cranial Nerves: No cranial nerve deficit. Sensory: No sensory deficit. Psychiatric:         Behavior: Behavior normal.         Thought Content:  Thought content normal.         ASSESSMENT/PLAN:  1. Benign essential HTN  Low sodium diet    2. Gastroesophageal reflux disease with esophagitis without hemorrhage  Diet/meds    3. Cigarette nicotine dependence with nicotine-induced disorder  Wean down to off  smoking    4. Screening for HIV (human immunodeficiency virus)    - HIV Screen; Future    5. Need for hepatitis C screening test    - Hep C AB RLFX HCV PCR-A; Future    6. Hyperglycemia    - Hemoglobin A1C; Future      Return in about 2 months (around 11/15/2021) for GERD. Patient Instructions   Blood work tomorrow. Keep cardio/psych f/u. No caffeine. All meds after food. Extensive counseling done to keep avoiding spicy, acidic tomato based foods or fatty foods like chocolate, citrus fruits (oranges, grapefruit etc.) and fruit juices. Limit intake of coffee, tea, alcohol and Bambi to one a day after food only. Watch your weight. Being overweight increases intra-abdominal pressure - which can aggravate heartburn or reflux. Don't gorge yourself at meal time. Eat smaller meals. Wait for 2 hours for exercise after eating. No eating or drinking (not even water) for 3-4 hours before lying down. May elevate head of bed with blocks , if needed. If smoking-stop or at least cut down on smoking. Strongly encouraged to quit using tobacco.   You can decrease your cigarette use by half every 1-2 weeks to quit smoking in 4-8 weeks or so. You can use off and on nicotine gum or lozenges to help quit smoking. The [address] for Disease Control and Prevention states:    Tobacco use harms every organ of the body which leads to disease and disability.  Tobacco use causes cancer, heart disease, stroke, and lung diseases (including emphysema, bronchitis, and chronic airway obstruction). We have many other ways to help you quit using tobacco.     We suggest this website:  www.smokefree. gov   You might also like this cell phone text message program. Text message charges apply on your cell phone plan. Text the word QUIT to [employer] to get started.  This program offers free telephone counseling. Dial 1-800-QUIT-Now    Discussed use, benefit, and side effects of prescribed medications. Barriers to compliance discussed. All patient questions answered. Pt voiced understanding. IF YOU NEED A PRESCRIPTION REFILL, THEN PLEASE GIVE US THREE WORKING DAYS TO REFILL A PRESCRIPTION. Office Hours to Answer Questions--Tuesday thru Friday --9.00 AM to 4.00 PM    Please get all OVER DUE VACCINATIONS done at the pharmacy or health department as soon as possible. Electronically signed by Jaime Oconnor MD on 9/15/2021 at 6:06 PM     This dictation was generated by voice recognition computer software. Although all attempts are made to edit the dictation for accuracy, there may be errors in the transcription that are not intended.

## 2021-09-16 ENCOUNTER — PROCEDURE VISIT (OUTPATIENT)
Dept: CARDIOLOGY CLINIC | Age: 36
End: 2021-09-16
Payer: COMMERCIAL

## 2021-09-16 DIAGNOSIS — R07.9 CHEST PAIN, UNSPECIFIED TYPE: ICD-10-CM

## 2021-09-16 DIAGNOSIS — R06.02 SOB (SHORTNESS OF BREATH): ICD-10-CM

## 2021-09-16 DIAGNOSIS — R00.2 PALPITATION: ICD-10-CM

## 2021-09-16 LAB
LV EF: 58 %
LVEF MODALITY: NORMAL

## 2021-09-16 PROCEDURE — 93306 TTE W/DOPPLER COMPLETE: CPT | Performed by: INTERNAL MEDICINE

## 2021-10-13 ENCOUNTER — TELEPHONE (OUTPATIENT)
Dept: CARDIOLOGY CLINIC | Age: 36
End: 2021-10-13

## 2021-10-13 NOTE — TELEPHONE ENCOUNTER
Pt calling needs an appt with LES. He is doing his stress test on 10/18 at [facility]. When and where can pt be curtis?  Pls call to advise Thank you

## 2021-10-15 PROBLEM — Z11.59 NEED FOR HEPATITIS C SCREENING TEST: Status: RESOLVED | Noted: 2021-09-15 | Resolved: 2021-10-15

## 2021-10-15 PROBLEM — Z11.4 SCREENING FOR HIV (HUMAN IMMUNODEFICIENCY VIRUS): Status: RESOLVED | Noted: 2021-09-15 | Resolved: 2021-10-15

## 2021-10-18 ENCOUNTER — PROCEDURE VISIT (OUTPATIENT)
Dept: CARDIOLOGY CLINIC | Age: 36
End: 2021-10-18
Payer: COMMERCIAL

## 2021-10-18 DIAGNOSIS — R00.2 PALPITATION: ICD-10-CM

## 2021-10-18 DIAGNOSIS — R07.9 CHEST PAIN, UNSPECIFIED TYPE: ICD-10-CM

## 2021-10-18 DIAGNOSIS — R06.02 SOB (SHORTNESS OF BREATH): ICD-10-CM

## 2021-10-18 PROCEDURE — 93015 CV STRESS TEST SUPVJ I&R: CPT | Performed by: INTERNAL MEDICINE

## 2021-11-10 ENCOUNTER — OFFICE VISIT (OUTPATIENT)
Dept: INTERNAL MEDICINE CLINIC | Age: 36
End: 2021-11-10
Payer: COMMERCIAL

## 2021-11-10 VITALS
BODY MASS INDEX: 28.28 KG/M2 | DIASTOLIC BLOOD PRESSURE: 80 MMHG | SYSTOLIC BLOOD PRESSURE: 120 MMHG | WEIGHT: 186.6 LBS | HEIGHT: 68 IN | OXYGEN SATURATION: 97 % | HEART RATE: 61 BPM | TEMPERATURE: 97.5 F

## 2021-11-10 DIAGNOSIS — J30.1 NON-SEASONAL ALLERGIC RHINITIS DUE TO POLLEN: ICD-10-CM

## 2021-11-10 DIAGNOSIS — J01.01 ACUTE RECURRENT MAXILLARY SINUSITIS: Primary | ICD-10-CM

## 2021-11-10 DIAGNOSIS — I10 BENIGN ESSENTIAL HTN: ICD-10-CM

## 2021-11-10 DIAGNOSIS — K21.00 GASTROESOPHAGEAL REFLUX DISEASE WITH ESOPHAGITIS WITHOUT HEMORRHAGE: ICD-10-CM

## 2021-11-10 DIAGNOSIS — F17.219 CIGARETTE NICOTINE DEPENDENCE WITH NICOTINE-INDUCED DISORDER: ICD-10-CM

## 2021-11-10 PROCEDURE — G8484 FLU IMMUNIZE NO ADMIN: HCPCS | Performed by: INTERNAL MEDICINE

## 2021-11-10 PROCEDURE — 4004F PT TOBACCO SCREEN RCVD TLK: CPT | Performed by: INTERNAL MEDICINE

## 2021-11-10 PROCEDURE — G8427 DOCREV CUR MEDS BY ELIG CLIN: HCPCS | Performed by: INTERNAL MEDICINE

## 2021-11-10 PROCEDURE — 99214 OFFICE O/P EST MOD 30 MIN: CPT | Performed by: INTERNAL MEDICINE

## 2021-11-10 PROCEDURE — G8419 CALC BMI OUT NRM PARAM NOF/U: HCPCS | Performed by: INTERNAL MEDICINE

## 2021-11-10 RX ORDER — ACETAMINOPHEN 500 MG
500 TABLET ORAL EVERY 6 HOURS PRN
COMMUNITY

## 2021-11-10 RX ORDER — LORATADINE 10 MG/1
10 TABLET ORAL DAILY
COMMUNITY
End: 2022-10-31

## 2021-11-10 RX ORDER — TRIAMCINOLONE ACETONIDE 55 UG/1
2 SPRAY, METERED NASAL DAILY
Qty: 1 EACH | Refills: 1 | Status: SHIPPED | OUTPATIENT
Start: 2021-11-10 | End: 2022-03-17

## 2021-11-10 RX ORDER — CEPHALEXIN 500 MG/1
500 CAPSULE ORAL 3 TIMES DAILY
Qty: 30 CAPSULE | Refills: 0 | Status: SHIPPED | OUTPATIENT
Start: 2021-11-10 | End: 2021-11-20

## 2021-11-10 SDOH — ECONOMIC STABILITY: FOOD INSECURITY: WITHIN THE PAST 12 MONTHS, THE FOOD YOU BOUGHT JUST DIDN'T LAST AND YOU DIDN'T HAVE MONEY TO GET MORE.: PATIENT DECLINED

## 2021-11-10 SDOH — ECONOMIC STABILITY: FOOD INSECURITY: WITHIN THE PAST 12 MONTHS, YOU WORRIED THAT YOUR FOOD WOULD RUN OUT BEFORE YOU GOT MONEY TO BUY MORE.: PATIENT DECLINED

## 2021-11-10 ASSESSMENT — PATIENT HEALTH QUESTIONNAIRE - PHQ9
SUM OF ALL RESPONSES TO PHQ QUESTIONS 1-9: 0
SUM OF ALL RESPONSES TO PHQ QUESTIONS 1-9: 0
1. LITTLE INTEREST OR PLEASURE IN DOING THINGS: 0
2. FEELING DOWN, DEPRESSED OR HOPELESS: 0
SUM OF ALL RESPONSES TO PHQ QUESTIONS 1-9: 0
SUM OF ALL RESPONSES TO PHQ9 QUESTIONS 1 & 2: 0

## 2021-11-10 ASSESSMENT — ENCOUNTER SYMPTOMS
RHINORRHEA: 0
CHEST TIGHTNESS: 0
SHORTNESS OF BREATH: 0
EYE PAIN: 0
NAUSEA: 0
SORE THROAT: 0
WHEEZING: 0
BLOOD IN STOOL: 0
ABDOMINAL PAIN: 0
VOMITING: 0
SINUS PRESSURE: 1
EYE DISCHARGE: 0
COUGH: 0
SINUS PAIN: 0
TROUBLE SWALLOWING: 0

## 2021-11-10 ASSESSMENT — SOCIAL DETERMINANTS OF HEALTH (SDOH): HOW HARD IS IT FOR YOU TO PAY FOR THE VERY BASICS LIKE FOOD, HOUSING, MEDICAL CARE, AND HEATING?: PATIENT DECLINED

## 2021-11-10 NOTE — PATIENT INSTRUCTIONS
Cephalexin 500 mg 3 times a day for 10 days. Yogurt or probiotic.    64 ounce water from 8 AM to 6 PM.    Nasacort nose spray 2 sprays at bedtime or 1 spray twice a day for 2 weeks. May try cetirizine or Zyrtec 10 mg next time in the evening and watch drowsiness for allergies. Strongly encouraged to quit using tobacco.   You can decrease your cigarette use by half every 1-2 weeks to quit smoking in 4-8 weeks or so. You can use off and on nicotine gum or lozenges to help quit smoking. The [address] for Disease Control and Prevention states:    Tobacco use harms every organ of the body which leads to disease and disability.  Tobacco use causes cancer, heart disease, stroke, and lung diseases (including emphysema, bronchitis, and chronic airway obstruction). We have many other ways to help you quit using tobacco.     We suggest this website:  www.smokefree. gov   You might also like this cell phone text message program.  Text message charges apply on your cell phone plan. Text the word QUIT to [employer] to get started.  This program offers free telephone counseling. Dial 1-800-QUIT-Now    No caffeine and keep low-sodium diet for the heart and blood pressure. Stress test was good and smoking keeping some shortness of breath. Mild adult soap only and make sure no chemical exposure to the fingers with wearing yellow gloves for dishwashing or laundry detergent exposure and make sure using Eucerin cream 2-3 times a day for any skin issues. Extensive counseling done to keep avoiding spicy, acidic tomato based foods or fatty foods like chocolate, citrus fruits (oranges, grapefruit etc.) and fruit juices. Limit intake of coffee, tea, alcohol and Bambi to one a day after food only. Watch your weight. Being overweight increases intra-abdominal pressure - which can aggravate heartburn or reflux. Don't gorge yourself at meal time. Eat smaller meals. Wait for 2 hours for exercise after eating.   No eating or drinking (not even water) for 3-4 hours before lying down. May elevate head of bed with blocks , if needed. If smoking-stop or at least cut down on smoking. Discussed use, benefit, and side effects of prescribed medications. Barriers to compliance discussed. All patient questions answered. Pt voiced understanding. IF YOU NEED A PRESCRIPTION REFILL, THEN PLEASE GIVE US THREE WORKING DAYS TO REFILL A PRESCRIPTION.   Office Hours to Answer Questions--Tuesday thru Friday --9.00 AM to 4.00 PM

## 2021-11-10 NOTE — PROGRESS NOTES
11/10/2021     Dori Deng (: 1985) is a 39 y.o. male, here for evaluation of the following medical concerns:    Chief Complaint   Patient presents with    Hyperlipidemia    Sinusitis     possible sinus infection ongoing x 1 month, bilateral earache     Suture / Staple Removal     left thumb stitch removal        Cut finger doing dishes--staples removal--L thumb--can remove himself  But he is sick around him with Covid symptoms. Hypertension    Watching low-sodium diet. Taking blood pressure medications regularly. Blood pressure checked off and on and trying to keep a goal of blood pressure less than 130/85 most of the time. Denies any chest pain / palpitation / shortness of breath / lightheadedness etc.   The available labs reviewed and analyzed and independent interpretation of the results explained at length. Lab Results       Component                Value               Date                       NA                       137                 2021                 K                        4.1                 2021                 CL                       97                  2021                 CO2                      23                  2021                 BUN                      14                  2021                 CREATININE               0.7                 2021                 GLUCOSE                  132                 2021                 CALCIUM                  9.5                 2021                Smoking    The [address] for Disease Control and Prevention states:    Tobacco use harms every organ of the body which leads to disease and disability. Tobacco use causes cancer, heart disease, stroke, and lung diseases (including emphysema, bronchitis, and chronic airway obstruction). strongly encouraged  to quit using tobacco.   You can decrease your cigarette use by half every 1-2 weeks to quit smoking in 4-8 weeks or so. You can use off and on nicotine gum or lozenges to help quit smoking. Right finger rash probably related to Band-Aid or chemical exposure      Gastroesophageal Reflux Disease:    Watching gastroesophageal reflux diet. Taking medications as prescribed without any side effects. Working on trying to lose weight. Working on trying not to eat for 3 to 4 hours before lying down. Sinusitis  This is a new problem. Episode onset: 2-3 mths. The problem has been waxing and waning since onset. There has been no fever. Associated symptoms include congestion (  [id number] phlegm) and sinus pressure. Pertinent negatives include no chills, coughing, ear pain, headaches, shortness of breath or sore throat. Treatments tried: Claritin not helping. The treatment provided no relief. Review of Systems   Constitutional: Negative for appetite change, chills, fever and unexpected weight change. HENT: Positive for congestion (  [id number] phlegm) and sinus pressure. Negative for ear discharge, ear pain, nosebleeds, rhinorrhea, sinus pain, sore throat and trouble swallowing. Eyes: Negative for pain and discharge. Respiratory: Negative for cough, chest tightness, shortness of breath and wheezing. Cardiovascular: Negative for chest pain, palpitations and leg swelling. Gastrointestinal: Negative for abdominal pain, blood in stool, nausea and vomiting. Endocrine: Negative for polydipsia and polyphagia. Genitourinary: Negative for difficulty urinating, enuresis, flank pain and hematuria. Musculoskeletal: Negative for myalgias. Skin: Positive for rash. Neurological: Negative for facial asymmetry, weakness, light-headedness, numbness and headaches. Psychiatric/Behavioral: Negative for confusion.        Current Outpatient Medications on File Prior to Visit   Medication Sig Dispense Refill    acetaminophen (TYLENOL) 500 MG tablet Take 500 mg by mouth every 6 hours as needed      loratadine (CLARITIN) 10 MG tablet Take 10 mg by mouth daily      omeprazole (PRILOSEC) 20 MG delayed release capsule TAKE ONE CAPSULE BY MOUTH EVERY MORNING BEFORE BREAKFAST 30 capsule 1    metoprolol tartrate (LOPRESSOR) 25 MG tablet Take 1 tablet by mouth 2 times daily 180 tablet 1    buprenorphine-naloxone (SUBOXONE) 8-2 MG SUBL SL tablet Take 1 table twice a day      hydrOXYzine (ATARAX) 50 MG tablet Take 50 mg by mouth 3 times daily as needed for Itching      zolpidem (AMBIEN) 10 MG tablet nightly. No current facility-administered medications on file prior to visit. Past Medical History:   Diagnosis Date    Anxiety     Benign essential HTN 2015    Episode of recurrent major depressive disorder ([facility] [address] 2018    Midline low back pain without sciatica 2016    Panic anxiety syndrome     Sedative, hypnotic or anxiolytic abuse, continuous ([facility] 9/15/2018    Sleep disturbances       Social History     Tobacco Use    Smoking status: Current Every Day Smoker     Packs/day: 1.00     Years: 14.00     Pack years: 14.00    Smokeless tobacco: Never Used   Substance Use Topics    Alcohol use: No      Family History   Problem Relation Age of Onset    Thyroid Disease Mother     Stroke Mother     Hypertension Mother     Other Father     Diabetes Father          at 55-Oxycontin OD        Vitals:    11/10/21 1329   BP: 120/80   Site: Left Upper Arm   Position: Sitting   Cuff Size: Large Adult   Pulse: 61   Temp: 97.5 °F (36.4 °C)   TempSrc: Temporal   SpO2: 97%   Weight: 186 lb 9.6 oz (84.6 kg)   Height: 5' 7.5\" (1.715 m)     Estimated body mass index is 28.79 kg/m² as calculated from the following:    Height as of this encounter: 5' 7.5\" (1.715 m). Weight as of this encounter: 186 lb 9.6 oz (84.6 kg). Physical Exam  Vitals and nursing note reviewed. Constitutional:       General: He is not in acute distress. HENT:      Head: Normocephalic and atraumatic.       Right Ear: Tympanic membrane, ear canal and external ear normal.      Left Ear: Tympanic membrane, ear canal and external ear normal.      Nose: Congestion (  maxillary sinus tender) present. Eyes:      General: Lids are normal.      Conjunctiva/sclera: Conjunctivae normal.      Pupils: Pupils are equal, round, and reactive to light. Neck:      Thyroid: No thyromegaly. Vascular: No JVD. Trachea: No tracheal deviation. Cardiovascular:      Rate and Rhythm: Normal rate and regular rhythm. Heart sounds: Normal heart sounds. No gallop. Pulmonary:      Effort: Pulmonary effort is normal. No respiratory distress. Breath sounds: Normal breath sounds. No wheezing or rales. Abdominal:      General: Bowel sounds are normal.      Palpations: Abdomen is soft. There is no mass. Tenderness: There is no abdominal tenderness. Musculoskeletal:         General: No tenderness. Cervical back: Neck supple. Comments: No leg edema or calf tenderness   Lymphadenopathy:      Cervical: No cervical adenopathy. Skin:     General: Skin is warm and dry. Findings: Rash (  R hand dry skin itchingL thumb sutures healing) present. Neurological:      Mental Status: He is alert and oriented to person, place, and time. Cranial Nerves: No cranial nerve deficit. Sensory: No sensory deficit. Psychiatric:         Behavior: Behavior normal.         Thought Content: Thought content normal.         ASSESSMENT/PLAN:  1. Acute recurrent maxillary sinusitis    - Lyle Leon DO, Otolaryngology, [facility]  - cephALEXin (KEFLEX) 500 MG capsule; Take 1 capsule by mouth 3 times daily for 10 days  Dispense: 30 capsule; Refill: 0    2. Cigarette nicotine dependence with nicotine-induced disorder  Wean off cigs    3. Benign essential HTN  Low sod/ bp meds    4. Gastroesophageal reflux disease with esophagitis without hemorrhage  Diet / meds    5.  Non-seasonal allergic rhinitis due to pollen    - triamcinolone (NASACORT ALLERGY 24HR) 55 MCG/ACT nasal inhaler; 2 sprays by Each Nostril route daily  Dispense: 1 each; Refill: 1      Return in about 3 months (around 2/10/2022) for blood pressure. Patient Instructions   Cephalexin 500 mg 3 times a day for 10 days. Yogurt or probiotic.    64 ounce water from 8 AM to 6 PM.    Nasacort nose spray 2 sprays at bedtime or 1 spray twice a day for 2 weeks. May try cetirizine or Zyrtec 10 mg next time in the evening and watch drowsiness for allergies. Strongly encouraged to quit using tobacco.   You can decrease your cigarette use by half every 1-2 weeks to quit smoking in 4-8 weeks or so. You can use off and on nicotine gum or lozenges to help quit smoking. The [address] for Disease Control and Prevention states:    Tobacco use harms every organ of the body which leads to disease and disability.  Tobacco use causes cancer, heart disease, stroke, and lung diseases (including emphysema, bronchitis, and chronic airway obstruction). We have many other ways to help you quit using tobacco.     We suggest this website:  www.smokefree. gov   You might also like this cell phone text message program.  Text message charges apply on your cell phone plan. Text the word QUIT to [employer] to get started.  This program offers free telephone counseling. Dial 1-800-QUIT-Now    No caffeine and keep low-sodium diet for the heart and blood pressure. Stress test was good and smoking keeping some shortness of breath. Mild adult soap only and make sure no chemical exposure to the fingers with wearing yellow gloves for dishwashing or laundry detergent exposure and make sure using Eucerin cream 2-3 times a day for any skin issues. Extensive counseling done to keep avoiding spicy, acidic tomato based foods or fatty foods like chocolate, citrus fruits (oranges, grapefruit etc.) and fruit juices. Limit intake of coffee, tea, alcohol and Bambi to one a day after food only. Watch your weight.  Being overweight increases intra-abdominal pressure - which can aggravate heartburn or reflux. Don't gorge yourself at meal time. Eat smaller meals. Wait for 2 hours for exercise after eating. No eating or drinking (not even water) for 3-4 hours before lying down. May elevate head of bed with blocks , if needed. If smoking-stop or at least cut down on smoking. Discussed use, benefit, and side effects of prescribed medications. Barriers to compliance discussed. All patient questions answered. Pt voiced understanding. IF YOU NEED A PRESCRIPTION REFILL, THEN PLEASE GIVE US THREE WORKING DAYS TO REFILL A PRESCRIPTION. Office Hours to Answer Questions--Tuesday thru Friday --9.00 AM to 4.00 PM           Electronically signed by Jam Luna MD on 11/10/2021 at 2:29 PM     This dictation was generated by voice recognition computer software. Although all attempts are made to edit the dictation for accuracy, there may be errors in the transcription that are not intended.

## 2021-12-22 DIAGNOSIS — R07.2 PRECORDIAL PAIN: ICD-10-CM

## 2021-12-22 RX ORDER — OMEPRAZOLE 20 MG/1
CAPSULE, DELAYED RELEASE ORAL
Qty: 30 CAPSULE | Refills: 1 | Status: SHIPPED | OUTPATIENT
Start: 2021-12-22 | End: 2022-03-17 | Stop reason: SDUPTHER

## 2022-01-04 ENCOUNTER — OFFICE VISIT (OUTPATIENT)
Dept: ENT CLINIC | Age: 37
End: 2022-01-04
Payer: COMMERCIAL

## 2022-01-04 ENCOUNTER — TELEPHONE (OUTPATIENT)
Dept: ENT CLINIC | Age: 37
End: 2022-01-04

## 2022-01-04 VITALS
SYSTOLIC BLOOD PRESSURE: 114 MMHG | TEMPERATURE: 97.5 F | WEIGHT: 187 LBS | DIASTOLIC BLOOD PRESSURE: 80 MMHG | RESPIRATION RATE: 16 BRPM | HEIGHT: 68 IN | BODY MASS INDEX: 28.34 KG/M2 | OXYGEN SATURATION: 95 % | HEART RATE: 79 BPM

## 2022-01-04 DIAGNOSIS — J32.9 CHRONIC SINUSITIS, UNSPECIFIED LOCATION: Primary | ICD-10-CM

## 2022-01-04 DIAGNOSIS — H69.83 DYSFUNCTION OF BOTH EUSTACHIAN TUBES: ICD-10-CM

## 2022-01-04 DIAGNOSIS — J34.3 HYPERTROPHY OF BOTH INFERIOR NASAL TURBINATES: ICD-10-CM

## 2022-01-04 DIAGNOSIS — J34.2 DNS (DEVIATED NASAL SEPTUM): ICD-10-CM

## 2022-01-04 DIAGNOSIS — H91.90 PERCEIVED HEARING LOSS: ICD-10-CM

## 2022-01-04 DIAGNOSIS — Z87.09 HISTORY OF NASAL POLYP: ICD-10-CM

## 2022-01-04 DIAGNOSIS — R09.81 NASAL CONGESTION: ICD-10-CM

## 2022-01-04 DIAGNOSIS — J30.9 ALLERGIC RHINITIS, UNSPECIFIED SEASONALITY, UNSPECIFIED TRIGGER: ICD-10-CM

## 2022-01-04 PROCEDURE — 99204 OFFICE O/P NEW MOD 45 MIN: CPT | Performed by: STUDENT IN AN ORGANIZED HEALTH CARE EDUCATION/TRAINING PROGRAM

## 2022-01-04 PROCEDURE — G8427 DOCREV CUR MEDS BY ELIG CLIN: HCPCS | Performed by: STUDENT IN AN ORGANIZED HEALTH CARE EDUCATION/TRAINING PROGRAM

## 2022-01-04 PROCEDURE — G8484 FLU IMMUNIZE NO ADMIN: HCPCS | Performed by: STUDENT IN AN ORGANIZED HEALTH CARE EDUCATION/TRAINING PROGRAM

## 2022-01-04 PROCEDURE — 4004F PT TOBACCO SCREEN RCVD TLK: CPT | Performed by: STUDENT IN AN ORGANIZED HEALTH CARE EDUCATION/TRAINING PROGRAM

## 2022-01-04 PROCEDURE — G8419 CALC BMI OUT NRM PARAM NOF/U: HCPCS | Performed by: STUDENT IN AN ORGANIZED HEALTH CARE EDUCATION/TRAINING PROGRAM

## 2022-01-04 RX ORDER — LEVOCETIRIZINE DIHYDROCHLORIDE 5 MG/1
5 TABLET, FILM COATED ORAL NIGHTLY
Qty: 30 TABLET | Refills: 2 | Status: SHIPPED | OUTPATIENT
Start: 2022-01-04 | End: 2022-04-04

## 2022-01-04 RX ORDER — HYDROXYZINE PAMOATE 50 MG/1
50 CAPSULE ORAL
COMMUNITY

## 2022-01-04 RX ORDER — PREDNISONE 10 MG/1
TABLET ORAL
Qty: 21 TABLET | Refills: 0 | Status: SHIPPED | OUTPATIENT
Start: 2022-01-04 | End: 2022-03-17

## 2022-01-04 RX ORDER — FLUTICASONE PROPIONATE 50 MCG
1 SPRAY, SUSPENSION (ML) NASAL DAILY
Qty: 32 G | Refills: 1 | Status: SHIPPED | OUTPATIENT
Start: 2022-01-04 | End: 2022-03-16

## 2022-01-04 NOTE — PROGRESS NOTES
[address] [facility] SURGERY  NEW PATIENT HISTORY AND PHYSICAL NOTE      Patient Name: Marko Grant Record Number:  [id number]  Primary Care Physician:  Lex Norris MD    ChiefComplaint     Chief Complaint   Patient presents with    Ear Problem    Sinus Problem       History of Present Illness     Den Barros is an 39 y.o. male presenting with ear and sinus issues. Lifelong sinus and allergy issues. Difficulty breathing through right side of nose, will make wheezing sound at time. Was treated for sinus infection with a course of azithromycin 1 month ago. Currently feels like he does not have a sinus infection. Has constant facial pressure. No sinonasal purulent discharge. Sense of smell decreased but intact. On triamcinolone nasal spray daily. No recent use of oral steroids. Sinus symptoms usually worse at night. + environmental allergies. Has tried Zyrtec, allegra tried in the past. No hx of asthma. Has had 4 sinonasal surgeries total, last one was approx 15 years ago. Was told he had polyps in the past.  No history of asthma or aspirin sensitivity. States he had a septoplasty around age 11, followed by 2 sinus surgeries and the one revision surgery for scarring. Has done nasal rinses but feels like when he puts of fluid in it does not come out so he does not like doing it. Ears will pop and then can feel drainage in ear. Unable to auto insufflate when blowing nose. Ears itchy. Ears ring constantly, but get better after they pop. Some yellow drainage from ear on Q-tip. No Q-tip use over the last 6 months.        Past Medical History     Past Medical History:   Diagnosis Date    Anxiety     Benign essential HTN 12/18/2015    Episode of recurrent major depressive disorder ([address] 9/25/2018    Midline low back pain without sciatica 9/28/2016    Panic anxiety syndrome     Sedative, hypnotic or anxiolytic abuse, continuous ([address] 9/15/2018    Sleep disturbances        Past Surgical History     Past Surgical History:   Procedure Laterality Date    APPENDECTOMY      before 11years of age   [facility] BACK SURGERY      removed fragments of tailbone    TONSILLECTOMY      preteen       Family History     Family History   Problem Relation Age of Onset    Thyroid Disease Mother     Stroke Mother     Hypertension Mother     Other Father     Diabetes Father          at 55-Oxycontin OD       Social History     Social History     Tobacco Use    Smoking status: Current Every Day Smoker     Packs/day: 1.00     Years: 14.00     Pack years: 14.00    Smokeless tobacco: Never Used   Vaping Use    Vaping Use: Never used   Substance Use Topics    Alcohol use: No    Drug use: No        Allergies     Allergies   Allergen Reactions    Quetiapine     Tramadol      Other reaction(s): Muscle Rigidity, Muscle Spasm       Medications     Current Outpatient Medications   Medication Sig Dispense Refill    hydrOXYzine (VISTARIL) 50 MG capsule Take 50 mg by mouth every 3 hours as needed      fluticasone (FLONASE) 50 MCG/ACT nasal spray 1 spray by Each Nostril route daily 32 g 1    levocetirizine (XYZAL) 5 MG tablet Take 1 tablet by mouth nightly 30 tablet 2    predniSONE (DELTASONE) 10 MG tablet 1 tablet 3 times/day x 5 days, 1 tablet 2 times/days x 2 days, 1 tablet daily for 2 days. Take with food.  21 tablet 0    omeprazole (PRILOSEC) 20 MG delayed release capsule TAKE ONE CAPSULE BY MOUTH EVERY MORNING BEFORE BREAKFAST 30 capsule 1    acetaminophen (TYLENOL) 500 MG tablet Take 500 mg by mouth every 6 hours as needed      loratadine (CLARITIN) 10 MG tablet Take 10 mg by mouth daily      triamcinolone (NASACORT ALLERGY 24HR) 55 MCG/ACT nasal inhaler 2 sprays by Each Nostril route daily 1 each 1    metoprolol tartrate (LOPRESSOR) 25 MG tablet Take 1 tablet by mouth 2 times daily 180 tablet 1    buprenorphine-naloxone (SUBOXONE) 8-2 MG SUBL SL tablet Take 1 table twice a day      hydrOXYzine (ATARAX) 50 MG tablet Take 50 mg by mouth 3 times daily as needed for Itching      zolpidem (AMBIEN) 10 MG tablet nightly. (Patient not taking: Reported on 1/4/2022)       No current facility-administered medications for this visit. Review of Systems     REVIEW OF SYSTEMS  The following systems were reviewed and revealed the following in addition to any already discussed in the HPI:    CONSTITUTIONAL: no weight loss, no fever, no night sweats, no chills  EYES: no vision changes, no blurry vision  EARS: + Perceived hearing loss, no otalgia  NOSE: no epistaxis, no rhinorrhea  THROAT: No voice changes, no sore throat, no dysphagia      PhysicalExam     Vitals:    01/04/22 1126   BP: 114/80   Site: Left Upper Arm   Position: Sitting   Cuff Size: Medium Adult   Pulse: 79   Resp: 16   Temp: 97.5 °F (36.4 °C)   TempSrc: Temporal   SpO2: 95%   Weight: 187 lb (84.8 kg)   Height: 5' 7.5\" (1.715 m)       PHYSICAL EXAM  /80 (Site: Left Upper Arm, Position: Sitting, Cuff Size: Medium Adult)   Pulse 79   Temp 97.5 °F (36.4 °C) (Temporal)   Resp 16   Ht 5' 7.5\" (1.715 m)   Wt 187 lb (84.8 kg)   SpO2 95%   BMI 28.86 kg/m²     GENERAL: No acute distress, alert and oriented, no hoarseness  EYES: EOMI, Anti-icteric  NOSE: On anterior rhinoscopy there is no epistaxis, nasal mucosa moist and normal appearing, no purulent drainage. Nasal septum deviated to the right. Inferior turbinate hypertrophied bilaterally. EARS: Normal external appearance; on portable otomicroscopy:     -Ad: External auditory canal without stenosis, tympanic membrane clear, no middle ear effusions or retractions.      -As: External auditory canal without stenosis, tympanic membrane clear, no middle ear effusions or retractions.    Pneumatic otoscopy: Bilateral tympanic membranes mobile pneumatic otoscopy  FACE: HB 1/6 bilaterally, symmetric appearing, sensation equal bilaterally  ORAL CAVITY: No masses or lesions visualized or palpated, uvula is midline, moist mucous membranes, absent tonsils  NECK: Normal range of motion, no thyromegaly, trachea is midline, no palpable lymphadenopathy or neck masses, no crepitus  NEURO: Cranial Nerves 2, 3, 4, 5, 6, 7, 11, 12 grossly intact bilaterally     I have performed a head and neck physical exam personally or was physically present during the key or critical portions of the service. Assessment and Plan     1. Chronic sinusitis, unspecified location  2. History of nasal polyp  Prednisone taper  - CT SINUS FOR IMAGE GUIDANCE; Future    3. DNS (deviated nasal septum)  4. Hypertrophy of both inferior nasal turbinates  5. Nasal congestion  Start Flonase daily    6. Allergic rhinitis, unspecified seasonality, unspecified trigger  Start Xyzal    7. Perceived hearing loss  We will obtain comprehensive audiological evaluation at follow-up  - Audiometry with tympanometry; Future    8. Dysfunction of both eustachian tubes  Start Flonase daily  Auto insufflation techniques discussed      Follow Up     Return in about 1 month (around 2/4/2022) for after imaging. Law Grover   Department of Otolaryngology/Head & Neck Surgery  1/4/22    Medical Decision Making: The following items were considered in medical decision making:  Independent review of images  Review / order clinical lab tests  Review / order radiology tests  Decision to obtain old records    This note was generated completely or in part utilizing Dragon dictation speech recognition software. Occasionally, words are mistranscribed and despite editing, the text may contain inaccuracies due to incorrect word recognition. If further clarification is needed please contact the office at [phone].

## 2022-01-04 NOTE — TELEPHONE ENCOUNTER
We did receive a fax from [facility] regarding a PA for patient's medication for Levocertirizine Dihydrochloride  5 mg     Request scanned      Please advise  heri

## 2022-01-06 ENCOUNTER — TELEPHONE (OUTPATIENT)
Dept: ENT CLINIC | Age: 37
End: 2022-01-06

## 2022-01-30 ENCOUNTER — HOSPITAL ENCOUNTER (OUTPATIENT)
Dept: CT IMAGING | Age: 37
Discharge: HOME | End: 2022-01-30
Payer: COMMERCIAL

## 2022-01-30 DIAGNOSIS — J32.9 CHRONIC SINUSITIS, UNSPECIFIED LOCATION: ICD-10-CM

## 2022-01-30 PROCEDURE — 70486 CT MAXILLOFACIAL W/O DYE: CPT

## 2022-02-14 ENCOUNTER — PROCEDURE VISIT (OUTPATIENT)
Dept: AUDIOLOGY | Age: 37
End: 2022-02-14
Payer: COMMERCIAL

## 2022-02-14 ENCOUNTER — OFFICE VISIT (OUTPATIENT)
Dept: ENT CLINIC | Age: 37
End: 2022-02-14
Payer: COMMERCIAL

## 2022-02-14 VITALS — HEART RATE: 80 BPM | SYSTOLIC BLOOD PRESSURE: 123 MMHG | DIASTOLIC BLOOD PRESSURE: 80 MMHG | TEMPERATURE: 97.3 F

## 2022-02-14 DIAGNOSIS — H91.90 PERCEIVED HEARING LOSS: ICD-10-CM

## 2022-02-14 DIAGNOSIS — H90.11 CONDUCTIVE HEARING LOSS OF RIGHT EAR WITH UNRESTRICTED HEARING OF LEFT EAR: Primary | ICD-10-CM

## 2022-02-14 DIAGNOSIS — H69.83 DYSFUNCTION OF BOTH EUSTACHIAN TUBES: ICD-10-CM

## 2022-02-14 DIAGNOSIS — J34.2 DNS (DEVIATED NASAL SEPTUM): ICD-10-CM

## 2022-02-14 DIAGNOSIS — J30.9 ALLERGIC RHINITIS, UNSPECIFIED SEASONALITY, UNSPECIFIED TRIGGER: Primary | ICD-10-CM

## 2022-02-14 PROCEDURE — 92567 TYMPANOMETRY: CPT | Performed by: AUDIOLOGIST

## 2022-02-14 PROCEDURE — 99214 OFFICE O/P EST MOD 30 MIN: CPT | Performed by: STUDENT IN AN ORGANIZED HEALTH CARE EDUCATION/TRAINING PROGRAM

## 2022-02-14 PROCEDURE — G8419 CALC BMI OUT NRM PARAM NOF/U: HCPCS | Performed by: STUDENT IN AN ORGANIZED HEALTH CARE EDUCATION/TRAINING PROGRAM

## 2022-02-14 PROCEDURE — 4004F PT TOBACCO SCREEN RCVD TLK: CPT | Performed by: STUDENT IN AN ORGANIZED HEALTH CARE EDUCATION/TRAINING PROGRAM

## 2022-02-14 PROCEDURE — G8427 DOCREV CUR MEDS BY ELIG CLIN: HCPCS | Performed by: STUDENT IN AN ORGANIZED HEALTH CARE EDUCATION/TRAINING PROGRAM

## 2022-02-14 PROCEDURE — 92557 COMPREHENSIVE HEARING TEST: CPT | Performed by: AUDIOLOGIST

## 2022-02-14 PROCEDURE — G8484 FLU IMMUNIZE NO ADMIN: HCPCS | Performed by: STUDENT IN AN ORGANIZED HEALTH CARE EDUCATION/TRAINING PROGRAM

## 2022-02-14 NOTE — PROGRESS NOTES
[address] VISIT      Patient Name: Marko Paz 65 Record Number:  [id number]  Primary Care Physician:  Uyen Dubose MD    ChiefComplaint     Chief Complaint   Patient presents with    Other     Review hearing test and CT scan, recheck sinuses. History of Present Illness     Sarai Fuentes is an 39 y.o. male previously seen for nasal congestion, eustachian tube dysfunction, perceived hearing loss, deviated nasal septum, allergic rhinitis. Interval History:   Symptoms significantly improved after prednisone and starting Flonase. Not on Xyzal, on cetirizine.       Past Medical History     Past Medical History:   Diagnosis Date    Anxiety     Benign essential HTN 2015    Episode of recurrent major depressive disorder ([facility] [address] [address] 2018    Midline low back pain without sciatica 2016    Panic anxiety syndrome     Sedative, hypnotic or anxiolytic abuse, continuous ([facility] [address] 9/15/2018    Sleep disturbances        Past Surgical History     Past Surgical History:   Procedure Laterality Date    APPENDECTOMY      before 11years of age   [facility]      removed fragments of tailbone    TONSILLECTOMY      preteen       Family History     Family History   Problem Relation Age of Onset    Thyroid Disease Mother     Stroke Mother     Hypertension Mother     Other Father     Diabetes Father          at 55-Oxycontin OD       Social History     Social History     Tobacco Use    Smoking status: Current Every Day Smoker     Packs/day: 1.00     Years: 14.00     Pack years: 14.00    Smokeless tobacco: Never Used   Vaping Use    Vaping Use: Never used   Substance Use Topics    Alcohol use: No    Drug use: No        Allergies     Allergies   Allergen Reactions    Quetiapine     Tramadol      Other reaction(s): Muscle Rigidity, Muscle Spasm       Medications     Current Outpatient Medications   Medication Sig Dispense Refill    hydrOXYzine (VISTARIL) 50 MG capsule Take 50 mg by mouth every 3 hours as needed      fluticasone (FLONASE) 50 MCG/ACT nasal spray 1 spray by Each Nostril route daily 32 g 1    levocetirizine (XYZAL) 5 MG tablet Take 1 tablet by mouth nightly 30 tablet 2    predniSONE (DELTASONE) 10 MG tablet 1 tablet 3 times/day x 5 days, 1 tablet 2 times/days x 2 days, 1 tablet daily for 2 days. Take with food. 21 tablet 0    omeprazole (PRILOSEC) 20 MG delayed release capsule TAKE ONE CAPSULE BY MOUTH EVERY MORNING BEFORE BREAKFAST 30 capsule 1    acetaminophen (TYLENOL) 500 MG tablet Take 500 mg by mouth every 6 hours as needed      loratadine (CLARITIN) 10 MG tablet Take 10 mg by mouth daily      triamcinolone (NASACORT ALLERGY 24HR) 55 MCG/ACT nasal inhaler 2 sprays by Each Nostril route daily 1 each 1    metoprolol tartrate (LOPRESSOR) 25 MG tablet Take 1 tablet by mouth 2 times daily 180 tablet 1    buprenorphine-naloxone (SUBOXONE) 8-2 MG SUBL SL tablet Take 1 table twice a day      zolpidem (AMBIEN) 10 MG tablet nightly.  hydrOXYzine (ATARAX) 50 MG tablet Take 50 mg by mouth 3 times daily as needed for Itching       No current facility-administered medications for this visit.        Review of Systems     REVIEW OF SYSTEMS  The following systems were reviewed and revealed the following in addition to any already discussed in the HPI:    CONSTITUTIONAL: no weight loss, no fever, no night sweats, no chills  EYES: no vision changes, no blurry vision  EARS: no hearing loss, no otalgia  NOSE: no epistaxis, no rhinorrhea  THROAT: No voice changes, no sore throat, no dysphagia    PhysicalExam     Vitals:    02/14/22 1433   BP: 123/80   Site: Left Upper Arm   Position: Sitting   Cuff Size: Medium Adult   Pulse: 80   Temp: 97.3 °F (36.3 °C)   TempSrc: Temporal       PHYSICAL EXAM  /80 (Site: Left Upper Arm, Position: Sitting, Cuff Size: Medium Adult)   Pulse 80   Temp 97.3 °F (36.3 °C) (Temporal) GENERAL: No acute distress, alert and oriented, no hoarseness  EYES: EOMI, Anti-icteric  NOSE: On anterior rhinoscopy there is no epistaxis, nasal mucosa moist and normal appearing, no purulent drainage. Nasal septum deviated to the right. EARS: Normal external appearance; on portable otomicroscopy:     -Ad: External auditory canal without stenosis, tympanic membrane clear, no middle ear effusions or retractions     -As: External auditory canal without stenosis, tympanic membrane clear, no middle ear effusions or retractions  FACE: HB 1/6 bilaterally, symmetric appearing, sensation equal bilaterally  ORAL CAVITY: No masses or lesions visualized or palpated, uvula is midline, moist mucous membranes, absent tonsils, dentition without evidence of major decay  NECK: Normal range of motion, no thyromegaly, trachea is midline, no palpable lymphadenopathy or neck masses, no crepitus  CHEST: Normal respiratory effort, breathing comfortably, no retractions  SKIN: No rashes, normal appearing skin, no evidence of skin lesions/tumors  NEURO: Cranial Nerves 2, 3, 4, 5, 6, 7, 11, 12 intact bilaterally     I have performed a head and neck physical exam personally or was physically present during the key or critical portions of the service. Data/Imaging Review     Comprehensive audiological evaluation performed in the office today by Van BURKS was independently reviewed by myself which demonstrates:    As: Hearing within normal limits in tested frequencies    Ad: Hearing within normal limits with isolated mild CHL at 250 Hz     % right, % left. Type A tympanogram right. Type A tympanogram left. EXAMINATION:   CT OF THE SINUS WITHOUT CONTRAST  1/30/2022 4:36 pm       TECHNIQUE:   CT of the sinuses was performed without the administration of intravenous   contrast. Multiplanar reformatted images are provided for review.  Dose   modulation, iterative reconstruction, and/or weight based adjustment of the   mA/kV was utilized to reduce the radiation dose to as low as reasonably   achievable.       COMPARISON:   None       HISTORY:   ORDERING SYSTEM PROVIDED HISTORY: Chronic sinusitis, unspecified location   TECHNOLOGIST PROVIDED HISTORY:   Reason for Exam: Chronic sinusitis, unspecified location   Relevant Medical/Surgical History: 4 previous sinus surgeries       FINDINGS:   SINUSES/MASTOIDS:  Bilateral inferior maxillary sinus mucous retention cysts   are visualized.  Small retention cyst is also noted involving the posterior   left ethmoid air cells.  The maxillary, sphenoid, ethmoid and frontal sinuses   are clear.  The bilateral ostiomeatal units are patent.  Bilateral nasal   antral window postoperative findings are present.  Ethmoid roofs are   symmetric.  The mastoid air cells are well aerated.       SOFT TISSUES:  Visualized soft tissues demonstrate no acute abnormality.  The   visualized portion of the intracranial contents demonstrate no gross acute   abnormality.           Impression   1. No evidence of sinusitis. 2. Bilateral inferior maxillary sinus and posterior left ethmoid air cells   mucous retention cysts. 3. Bilateral nasal antral window postoperative changes. Images from CT sinus without contrast performed on 1/30/22 independently reviewed which demonstrate patent bilateral nasal antral windows, mild nasal deviation to the right, bilateral small scottie bullosa, intrasinus retention cyst within the bilateral maxillary sinuses and within a left posterior ethmoid air cell. Assessment and Plan     1. Allergic rhinitis, unspecified seasonality, unspecified trigger  2. DNS (deviated nasal septum)  3. Dysfunction of both eustachian tubes  No significant sinus disease on CT scan, positive response to intranasal steroid sprays and antihistamine. Patient reassured. Symptoms more likely related to underlying allergies. Continue Flonase and cetirizine    4.  Perceived hearing loss  Reviewed audiometric results with patient. Hearing essentially within normal limit  - Encouraged loud noise avoidance and wearing of hearing protection when exposed. - Recommend surveillance of hearing with comprehensive audiological evaluation in 1-2 years. Follow-Up     Return if symptoms worsen or fail to improve. Dr. [place]oul [place] [id number]  Department of Otolaryngology/Head and Neck Surgery  2/18/22    Medical Decision Making: The following items were considered in medical decision making:  Independent review of images  Review / order clinical lab tests  Review / order radiology tests  Decision to obtain old records      This note was generated completely or in part utilizing Dragon dictation speech recognition software. Occasionally, words are mistranscribed and despite editing, the text may contain inaccuracies due to incorrect word recognition. If further clarification is needed please contact the office at [phone].

## 2022-02-16 NOTE — PROGRESS NOTES
[facility]  Division of Audiology/Otolaryngology    2/14/2022     Patient name: Harry You  Primary Care Physician: Sadie Reynolds MD   Medical Record Number: [id number]     AUDIOLOGIC AND OTHER PERTINENT MEDICAL HISTORY:      Harry You was seen for audiologic evaluation. Gaye Szymanski DO is referral source of today's appointment. Patient presents with muffled hearing. Primary concern is sinus issue and deviated septum. Wanting to rule out hearing loss. ASSESSMENT AND FINDINGS:     Otoscopy revealed: Visible tympanic membrane bilaterally    RIGHT EAR:  Hearing Sensitivity: Mild conductive loss rising to normal.30 dB air-bone gap at .250 kHz. Word Recognition: Excellent (%), based on NU-6   Tympanometry: Normal peak pressure and compliance, Type A tympanogram, consistent with normal middle ear function. Acoustic Reflexes: Ipsilateral: Present at elevated sensation levels and Absent. LEFT EAR:  Hearing Sensitivity: Normal hearing   Word Recognition: Excellent (%), based on NU-6   Tympanometry: Normal peak pressure and compliance, Type A tympanogram, consistent with normal middle ear function. Acoustic Reflexes: Ipsilateral: Absent. COUNSELING:        Reviewed purpose of testing completed today, general auditory system function, and results obtained today. Patient was provided with a copy of audiogram.        Degree of   Hearing Sensitivity dB Range   Within Normal Limits (WNL) 0 - 20   Mild 20 - 40   Moderate 40 - 55   Moderately-Severe 55 - 70   Severe 70 - 90   Profound 90 +        RECOMMENDATIONS:      Gaye Szymanski DO to medically advise.       Electronically signed by Johnnie Lynne on 2/16/22 at 8:19 AM.

## 2022-03-16 PROBLEM — K21.00 GASTROESOPHAGEAL REFLUX DISEASE WITH ESOPHAGITIS WITHOUT HEMORRHAGE: Chronic | Status: ACTIVE | Noted: 2021-09-15

## 2022-03-16 PROBLEM — Z83.3 FAMILY HISTORY OF DIABETES MELLITUS (DM): Chronic | Status: ACTIVE | Noted: 2018-02-13

## 2022-03-16 PROBLEM — F43.12 CHRONIC POST-TRAUMATIC STRESS DISORDER: Chronic | Status: ACTIVE | Noted: 2018-09-25

## 2022-03-16 PROBLEM — R30.0 DYSURIA: Status: RESOLVED | Noted: 2021-08-30 | Resolved: 2022-03-16

## 2022-03-16 PROBLEM — G89.29 CHRONIC LEFT SHOULDER PAIN: Chronic | Status: ACTIVE | Noted: 2018-02-13

## 2022-03-16 PROBLEM — J01.01 ACUTE RECURRENT MAXILLARY SINUSITIS: Status: RESOLVED | Noted: 2021-11-10 | Resolved: 2022-03-16

## 2022-03-16 PROBLEM — Z81.8: Chronic | Status: ACTIVE | Noted: 2018-02-13

## 2022-03-16 PROBLEM — M25.512 CHRONIC LEFT SHOULDER PAIN: Chronic | Status: ACTIVE | Noted: 2018-02-13

## 2022-03-16 PROBLEM — F11.93 OPIOID WITHDRAWAL (HCC): Status: RESOLVED | Noted: 2018-09-15 | Resolved: 2022-03-16

## 2022-03-16 PROBLEM — F41.1 GAD (GENERALIZED ANXIETY DISORDER): Chronic | Status: ACTIVE | Noted: 2018-02-13

## 2022-03-16 PROBLEM — R63.5 WEIGHT GAIN: Status: RESOLVED | Noted: 2021-08-30 | Resolved: 2022-03-16

## 2022-03-16 PROBLEM — F33.1 MAJOR DEPRESSIVE DISORDER, RECURRENT EPISODE, MODERATE (HCC): Chronic | Status: ACTIVE | Noted: 2018-09-25

## 2022-03-16 PROBLEM — Z91.89 AT RISK FOR SEXUALLY TRANSMITTED DISEASE DUE TO PARTNER WITH HIV: Chronic | Status: ACTIVE | Noted: 2018-02-13

## 2022-03-16 RX ORDER — FLUTICASONE PROPIONATE 50 MCG
SPRAY, SUSPENSION (ML) NASAL
Qty: 32 G | Refills: 1 | Status: SHIPPED | OUTPATIENT
Start: 2022-03-16

## 2022-03-16 RX ORDER — BUSPIRONE HYDROCHLORIDE 10 MG/1
TABLET ORAL
COMMUNITY
Start: 2022-02-13 | End: 2022-03-17

## 2022-03-16 RX ORDER — CLONIDINE HYDROCHLORIDE 0.1 MG/1
TABLET ORAL
COMMUNITY
Start: 2022-03-03 | End: 2022-03-17

## 2022-03-16 RX ORDER — MIRTAZAPINE 15 MG/1
15 TABLET, FILM COATED ORAL NIGHTLY
COMMUNITY
End: 2022-03-17

## 2022-03-16 RX ORDER — METOLAZONE 10 MG/1
10 TABLET ORAL DAILY
COMMUNITY
End: 2022-03-17

## 2022-03-16 RX ORDER — PROPRANOLOL HYDROCHLORIDE 10 MG/1
TABLET ORAL
COMMUNITY
End: 2022-03-17

## 2022-03-16 ASSESSMENT — ENCOUNTER SYMPTOMS
CONSTIPATION: 0
SORE THROAT: 0
ABDOMINAL PAIN: 0
SHORTNESS OF BREATH: 0
DIARRHEA: 0
RHINORRHEA: 0
CHEST TIGHTNESS: 0

## 2022-03-16 NOTE — PROGRESS NOTES
Subjective:   Patient ID: Everette Schofield is a 40 y.o. male here today to establish care. HPI by clinical support staff:   Chief Complaint   Patient presents with   Kayden Garg New Patient      Preliminary data above this line collected by clinical support staff.    ______________________________________________________________________  HPI by Provider:   HPI   Patient presents visit to establish care. History reviewed and updated with patient today. Reports a history of opioid and benzodiazepine addiction currently is being seen in a Suboxone clinic and is trying to work with her psychiatrist Royal Fatima with [employer] [facility] to get clean. States that he has transitioned multiple primary care providers due to this history. States that today's visit is mainly because he has a lot of fatigue, chest discomfort and low exercise tolerance accompanied with a lot of sweating. Has had blood work conducted but nothing has been diagnosed so far. He is convinced that he did have COVID-19 a few months ago although he was not diagnosed and is worried he might have long-haul a syndrome. Has been seen by cardiology and work-up including Holter monitor has been unremarkable. 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 Saw ENT for nasal congestion was told was a polyp would like a second opinion. Data above this line collected by Provider. Patient's medications, allergies, past medical, surgical, social and family histories were reviewed and updated as appropriate. Patient Care Team:  Fozia Leija MD as PCP - General (Family Medicine)  Frank Caceres MD as PCP - [facility] Empaneled Provider  Allergies   Allergen Reactions    Quetiapine      ortho stasis.     Tramadol      Other reaction(s): Muscle Rigidity, Muscle Spasm     Current Outpatient Medications on File Prior to Visit   Medication Sig Dispense Refill    fluticasone (FLONASE) 50 MCG/ACT nasal spray SPRAY ONE SPRAY IN EACH NOSTRIL ONCE DAILY 32 g 1    hydrOXYzine (VISTARIL) 50 MG capsule Take 50 mg by mouth every 3 hours as needed      levocetirizine (XYZAL) 5 MG tablet Take 1 tablet by mouth nightly 30 tablet 2    acetaminophen (TYLENOL) 500 MG tablet Take 500 mg by mouth every 6 hours as needed      loratadine (CLARITIN) 10 MG tablet Take 10 mg by mouth daily      buprenorphine-naloxone (SUBOXONE) 8-2 MG SUBL SL tablet Take 1 table twice a day      lamoTRIgine (LAMICTAL) 25 MG tablet        No current facility-administered medications on file prior to visit. Review of Systems   Constitutional: Positive for fatigue. Negative for activity change, appetite change and fever. HENT: Negative for congestion, rhinorrhea and sore throat. Respiratory: Negative for chest tightness and shortness of breath. Cardiovascular: Positive for chest pain. Negative for palpitations and leg swelling. Gastrointestinal: Negative for abdominal pain, constipation and diarrhea. Genitourinary: Negative for dysuria and frequency. Musculoskeletal: Negative for arthralgias. Neurological: Negative for dizziness, weakness and headaches. Psychiatric/Behavioral: Negative for hallucinations. All other systems reviewed and are negative. ROS above this line reviewed by Provider. Objective:   /76   Pulse 85   Ht 5' 7.5\" (1.715 m)   Wt 196 lb (88.9 kg)   SpO2 96%   BMI 30.24 kg/m²   Physical Exam  Vitals and nursing note reviewed. Constitutional:       General: He is not in acute distress. Appearance: Normal appearance. He is normal weight. He is not ill-appearing, toxic-appearing or diaphoretic. HENT:      Head: Normocephalic and atraumatic. Eyes:      General: No scleral icterus. Conjunctiva/sclera: Conjunctivae normal.   Cardiovascular:      Rate and Rhythm: Normal rate and regular rhythm. Heart sounds: Normal heart sounds. No murmur heard. No friction rub. No gallop.     Pulmonary:      Effort: Pulmonary effort is normal. No respiratory distress. Breath sounds: Normal breath sounds. No stridor. No wheezing, rhonchi or rales. Musculoskeletal:      Cervical back: Normal range of motion. Skin:     General: Skin is warm and dry. Neurological:      Mental Status: He is alert. Psychiatric:         Mood and Affect: Mood normal.         Behavior: Behavior normal.       Lab Results   Component Value Date    WBC 6.5 09/02/2021    HGB 14.7 09/02/2021    HCT 42.2 09/02/2021    MCV 81.2 09/02/2021     09/02/2021     Lab Results   Component Value Date     09/02/2021    K 4.1 09/02/2021    BUN 14 09/02/2021    CREATININE 0.7 09/02/2021    GLUCOSE 132 09/02/2021    CALCIUM 9.5 09/02/2021    BILITOT 0.6 09/02/2021    ALKPHOS 94 09/02/2021    AST 18 09/02/2021    ALT 17 09/02/2021    GFRAA >60 09/02/2021    GFRAA >60 01/16/2013     Lab Results   Component Value Date    TSH 0.48 02/11/2011     Lab Results   Component Value Date    LABA1C 5.2 09/16/2021     Lab Results   Component Value Date    .5 09/16/2021     No results found for: CHOL, TRIG, HDL, LDLCHOLESTEROL, CHOLHDLRATIO  No results found for: LABMICR, MLLE22KBU  No results found for: VITD25  Assessment and Plan:   1. 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 - metoprolol tartrate (LOPRESSOR) 25 MG tablet; Take 1 tablet by mouth 2 times daily  Dispense: 180 tablet; Refill: 1  - TSH; Future  - T4, Free; Future    2. Precordial pain  Likely psychological continue therapy with psychiatrist and psychologist.  - omeprazole (PRILOSEC) 20 MG delayed release capsule; Take 1 capsule by mouth every morning (before breakfast)  Dispense: 90 capsule; Refill: 1    3. Gastroesophageal reflux disease with esophagitis without hemorrhage  Chronic stable well-controlled continue current regimen. - omeprazole (PRILOSEC) 20 MG delayed release capsule;  Take 1 capsule by mouth every morning (before breakfast)  Dispense: 90 capsule; Refill: 1    4. Hypogonadism in male  Rule out organic causes of fatigue.  - Testosterone, free, total; Future    5. Nasal polyp  Patient requesting a second opinion.  - Ronal Raymond MD, Otolaryngology, [facility]         This chart note was prepared using a voice recognition dictation program. This note was reviewed for accuracy; however, addition, deletion and sound-alike word errors may occur. If there are any questions regarding this chart note, please contact the originating provider. Electronically signed by   Easton Juan MD  3/17/2022   12:30 PM    Return in about 4 weeks (around 4/14/2022) for Fatigue.

## 2022-03-17 ENCOUNTER — OFFICE VISIT (OUTPATIENT)
Dept: PRIMARY CARE CLINIC | Age: 37
End: 2022-03-17
Payer: COMMERCIAL

## 2022-03-17 VITALS
WEIGHT: 196 LBS | HEIGHT: 68 IN | HEART RATE: 85 BPM | BODY MASS INDEX: 29.7 KG/M2 | OXYGEN SATURATION: 96 % | DIASTOLIC BLOOD PRESSURE: 76 MMHG | SYSTOLIC BLOOD PRESSURE: 118 MMHG

## 2022-03-17 DIAGNOSIS — J33.9 NASAL POLYP: ICD-10-CM

## 2022-03-17 DIAGNOSIS — E29.1 HYPOGONADISM IN MALE: ICD-10-CM

## 2022-03-17 DIAGNOSIS — R07.2 PRECORDIAL PAIN: ICD-10-CM

## 2022-03-17 DIAGNOSIS — R00.2 PALPITATION: ICD-10-CM

## 2022-03-17 DIAGNOSIS — K21.00 GASTROESOPHAGEAL REFLUX DISEASE WITH ESOPHAGITIS WITHOUT HEMORRHAGE: Primary | ICD-10-CM

## 2022-03-17 PROBLEM — M25.512 CHRONIC LEFT SHOULDER PAIN: Status: RESOLVED | Noted: 2018-02-13 | Resolved: 2022-03-17

## 2022-03-17 PROBLEM — E87.6 HYPOKALEMIA: Status: RESOLVED | Noted: 2018-09-25 | Resolved: 2022-03-17

## 2022-03-17 PROBLEM — F17.219 CIGARETTE NICOTINE DEPENDENCE WITH NICOTINE-INDUCED DISORDER: Chronic | Status: ACTIVE | Noted: 2018-09-15

## 2022-03-17 PROBLEM — J30.1 NON-SEASONAL ALLERGIC RHINITIS DUE TO POLLEN: Chronic | Status: ACTIVE | Noted: 2021-11-10

## 2022-03-17 PROBLEM — G89.29 CHRONIC LEFT SHOULDER PAIN: Status: RESOLVED | Noted: 2018-02-13 | Resolved: 2022-03-17

## 2022-03-17 PROBLEM — F43.12 CHRONIC POST-TRAUMATIC STRESS DISORDER: Status: RESOLVED | Noted: 2018-09-25 | Resolved: 2022-03-17

## 2022-03-17 PROCEDURE — G8427 DOCREV CUR MEDS BY ELIG CLIN: HCPCS | Performed by: FAMILY MEDICINE

## 2022-03-17 PROCEDURE — 99204 OFFICE O/P NEW MOD 45 MIN: CPT | Performed by: FAMILY MEDICINE

## 2022-03-17 PROCEDURE — G8417 CALC BMI ABV UP PARAM F/U: HCPCS | Performed by: FAMILY MEDICINE

## 2022-03-17 PROCEDURE — 4004F PT TOBACCO SCREEN RCVD TLK: CPT | Performed by: FAMILY MEDICINE

## 2022-03-17 PROCEDURE — G8484 FLU IMMUNIZE NO ADMIN: HCPCS | Performed by: FAMILY MEDICINE

## 2022-03-17 RX ORDER — OMEPRAZOLE 20 MG/1
20 CAPSULE, DELAYED RELEASE ORAL
Qty: 90 CAPSULE | Refills: 1 | Status: SHIPPED | OUTPATIENT
Start: 2022-03-17 | End: 2022-09-30 | Stop reason: SDUPTHER

## 2022-03-17 RX ORDER — LAMOTRIGINE 25 MG/1
TABLET ORAL
COMMUNITY
Start: 2022-03-16 | End: 2022-06-08

## 2022-05-06 LAB
SEX HORMONE BINDING GLOBULIN: NORMAL
TESTOSTERONE FREE: 1.16
TESTOSTERONE TOTAL: 36
TESTOSTERONE TOTAL: NORMAL
TESTOSTERONE, BIOAVAILABLE: NORMAL
TSH SERPL DL<=0.05 MIU/L-ACNC: 1.16 UIU/ML
TSH SERPL DL<=0.05 MIU/L-ACNC: 3.13 UIU/ML

## 2022-06-08 ENCOUNTER — OFFICE VISIT (OUTPATIENT)
Dept: PRIMARY CARE CLINIC | Age: 37
End: 2022-06-08
Payer: COMMERCIAL

## 2022-06-08 VITALS
HEIGHT: 68 IN | WEIGHT: 198 LBS | TEMPERATURE: 98.6 F | SYSTOLIC BLOOD PRESSURE: 117 MMHG | DIASTOLIC BLOOD PRESSURE: 80 MMHG | OXYGEN SATURATION: 97 % | BODY MASS INDEX: 30.01 KG/M2 | HEART RATE: 82 BPM

## 2022-06-08 DIAGNOSIS — Z71.6 ENCOUNTER FOR SMOKING CESSATION COUNSELING: ICD-10-CM

## 2022-06-08 DIAGNOSIS — R07.9 CHEST PAIN AT REST: Primary | ICD-10-CM

## 2022-06-08 DIAGNOSIS — R79.89 LOW TESTOSTERONE: ICD-10-CM

## 2022-06-08 PROCEDURE — 99214 OFFICE O/P EST MOD 30 MIN: CPT | Performed by: FAMILY MEDICINE

## 2022-06-08 PROCEDURE — G8427 DOCREV CUR MEDS BY ELIG CLIN: HCPCS | Performed by: FAMILY MEDICINE

## 2022-06-08 PROCEDURE — 4004F PT TOBACCO SCREEN RCVD TLK: CPT | Performed by: FAMILY MEDICINE

## 2022-06-08 PROCEDURE — G8417 CALC BMI ABV UP PARAM F/U: HCPCS | Performed by: FAMILY MEDICINE

## 2022-06-08 RX ORDER — ONDANSETRON 4 MG/1
4 TABLET, ORALLY DISINTEGRATING ORAL EVERY 12 HOURS PRN
Qty: 30 TABLET | Refills: 0 | Status: SHIPPED | OUTPATIENT
Start: 2022-06-08 | End: 2022-10-31

## 2022-06-08 RX ORDER — NICOTINE 21 MG/24HR
1 PATCH, TRANSDERMAL 24 HOURS TRANSDERMAL DAILY
Qty: 42 PATCH | Refills: 1 | Status: SHIPPED | OUTPATIENT
Start: 2022-06-08 | End: 2022-06-22

## 2022-06-08 ASSESSMENT — ENCOUNTER SYMPTOMS
WHEEZING: 0
ABDOMINAL PAIN: 0
SHORTNESS OF BREATH: 0
CONSTIPATION: 0
SORE THROAT: 0
CHEST TIGHTNESS: 1
COUGH: 0
DIARRHEA: 0
RHINORRHEA: 0

## 2022-06-08 ASSESSMENT — PATIENT HEALTH QUESTIONNAIRE - PHQ9
7. TROUBLE CONCENTRATING ON THINGS, SUCH AS READING THE NEWSPAPER OR WATCHING TELEVISION: 2
8. MOVING OR SPEAKING SO SLOWLY THAT OTHER PEOPLE COULD HAVE NOTICED. OR THE OPPOSITE, BEING SO FIGETY OR RESTLESS THAT YOU HAVE BEEN MOVING AROUND A LOT MORE THAN USUAL: 0
SUM OF ALL RESPONSES TO PHQ QUESTIONS 1-9: 10
10. IF YOU CHECKED OFF ANY PROBLEMS, HOW DIFFICULT HAVE THESE PROBLEMS MADE IT FOR YOU TO DO YOUR WORK, TAKE CARE OF THINGS AT HOME, OR GET ALONG WITH OTHER PEOPLE: 1
3. TROUBLE FALLING OR STAYING ASLEEP: 3
SUM OF ALL RESPONSES TO PHQ QUESTIONS 1-9: 10
5. POOR APPETITE OR OVEREATING: 1
6. FEELING BAD ABOUT YOURSELF - OR THAT YOU ARE A FAILURE OR HAVE LET YOURSELF OR YOUR FAMILY DOWN: 0
SUM OF ALL RESPONSES TO PHQ QUESTIONS 1-9: 10
9. THOUGHTS THAT YOU WOULD BE BETTER OFF DEAD, OR OF HURTING YOURSELF: 0
4. FEELING TIRED OR HAVING LITTLE ENERGY: 3
2. FEELING DOWN, DEPRESSED OR HOPELESS: 1
SUM OF ALL RESPONSES TO PHQ9 QUESTIONS 1 & 2: 1
1. LITTLE INTEREST OR PLEASURE IN DOING THINGS: 0
SUM OF ALL RESPONSES TO PHQ QUESTIONS 1-9: 10

## 2022-06-08 NOTE — PROGRESS NOTES
Subjective:   Patient ID: Linette López is a 40 y.o. male. HPI by clinical support staff:   Chief Complaint   Patient presents with    Follow-up     test results     Other     referral for lung xrays     Other     pt states he was super sick last month       Preliminary data above this line collected by clinical support staff.    ______________________________________________________________________  HPI by Provider:   HPI   Presents today for follow up on labs- completed labs at [facility].  Seeing psych NP today to modify medications- tapering off suboxone and Ambien. Now on 10mg suboxone but plans to go down to 8mg on 10th. Feels a tightness in his chest but not sure if anxiety but will feel better if he got an xray to make sure there is nothing else. Data above this line collected by Provider. Patient's medications, allergies, past medical, surgical, social and family histories were reviewed and updated as appropriate. Patient Care Team:  Wm Aquino MD as PCP - General (Family Medicine)  Wm Aquino MD as PCP - [facility]  Current Outpatient Medications on File Prior to Visit   Medication Sig Dispense Refill    metoprolol tartrate (LOPRESSOR) 25 MG tablet Take 1 tablet by mouth 2 times daily 180 tablet 1    omeprazole (PRILOSEC) 20 MG delayed release capsule Take 1 capsule by mouth every morning (before breakfast) 90 capsule 1    fluticasone (FLONASE) 50 MCG/ACT nasal spray SPRAY ONE SPRAY IN EACH NOSTRIL ONCE DAILY 32 g 1    hydrOXYzine (VISTARIL) 50 MG capsule Take 50 mg by mouth every 3 hours as needed      acetaminophen (TYLENOL) 500 MG tablet Take 500 mg by mouth every 6 hours as needed      loratadine (CLARITIN) 10 MG tablet Take 10 mg by mouth daily      buprenorphine-naloxone (SUBOXONE) 8-2 MG SUBL SL tablet 10 tablets. Take 1 table twice a day       No current facility-administered medications on file prior to visit.      Review of Systems   Constitutional: Negative for activity change, appetite change, fatigue and fever. HENT: Negative for congestion, rhinorrhea and sore throat. Respiratory: Positive for chest tightness. Negative for cough, shortness of breath and wheezing. Cardiovascular: Negative for chest pain, palpitations and leg swelling. Gastrointestinal: Negative for abdominal pain, constipation and diarrhea. Genitourinary: Negative for dysuria and frequency. Musculoskeletal: Negative for arthralgias. Neurological: Negative for dizziness, weakness and headaches. Psychiatric/Behavioral: Negative for hallucinations. All other systems reviewed and are negative. ROS above this line reviewed by Provider. Objective:   /80 (Site: Left Upper Arm, Position: Sitting, Cuff Size: Medium Adult)   Pulse 82   Temp 98.6 °F (37 °C)   Ht 5' 7.5\" (1.715 m)   Wt 198 lb (89.8 kg)   SpO2 97%   BMI 30.55 kg/m²   Physical Exam  Vitals and nursing note reviewed. Constitutional:       General: He is not in acute distress. Appearance: Normal appearance. He is normal weight. He is not ill-appearing, toxic-appearing or diaphoretic. HENT:      Head: Normocephalic and atraumatic. Eyes:      General: No scleral icterus. Conjunctiva/sclera: Conjunctivae normal.   Cardiovascular:      Rate and Rhythm: Normal rate and regular rhythm. Heart sounds: Normal heart sounds. No murmur heard. No friction rub. No gallop. Pulmonary:      Effort: Pulmonary effort is normal. No respiratory distress. Breath sounds: Normal breath sounds. No stridor. No wheezing, rhonchi or rales. Musculoskeletal:      Cervical back: Normal range of motion. Skin:     General: Skin is warm and dry. Neurological:      Mental Status: He is alert. Psychiatric:         Mood and Affect: Mood normal.         Behavior: Behavior normal.       Assessment and Plan:   1.  Chest pain at rest  Likely psychological rule out  Other.  - XR CHEST STANDARD (2 VW); Future    2. Low testosterone  Labs reviewed Total T- 36 and free T 1.4- will repeat. - Testosterone, free, total; Future    3. Encounter for smoking cessation counseling  Patient motivated to quit smoking. The health complications of smoking were discussed (chronic cough, bronchitis, recurrent pneumonia, COPD, emphysema, lung cancer, etc) as well as the financial impact that is often overlooked. Patient was advised to gradually taper cigarettes off 1 cigarette at a time as tolerated and to try not to exceed that limit/self-imposed cap as much as possible. Consider starting on Bupropion for smoking cessation. Patient reports a smoking history as outlined below. I extensively counseled he about cessation and discussed resources available to assist in process. Patient has not tried Nicotine gum/ patches, Chantix, Wellbutrin,hypnosis. We spent >5 minutes counseling for smoking cessation- has good support at home. Social History     Tobacco History     Smoking Status  Current Every Day Smoker Smoking Frequency  1 pack/day for 14 years (14 pk yrs) Smoking Tobacco Type  Cigarettes    Smokeless Tobacco Use  Never Used            - nicotine (NICODERM CQ) 14 MG/24HR; Place 1 patch onto the skin daily for 14 days  Dispense: 42 patch; Refill: 1           This chart note was prepared using a voice recognition dictation program. This note was reviewed for accuracy; however, addition, deletion and sound-alike word errors may occur. If there are any questions regarding this chart note, please contact the originating provider. Electronically signed by   Haven Hartley MD  6/8/2022   3:45 PM    Return in about 4 weeks (around 7/6/2022) for Smoking Cessation.

## 2022-06-08 NOTE — PATIENT INSTRUCTIONS
Patient Education        Stopping Smoking: Care Instructions  Your Care Instructions     Cigarette smokers crave the nicotine in cigarettes. Giving it up is much harder than simply changing a habit. Your body has to stop craving the nicotine. It is hard to quit, but you can do it. There are many tools that people use to quitsmoking. You may find that combining tools works best for you. There are several steps to quitting. First you get ready to quit. Then you get support to help you. After that, you learn new skills and behaviors to become anonsmoker. For many people, a necessary step is getting and using medicine. Your doctor will help you set up the plan that best meets your needs. You may want to attend a smoking cessation program to help you quit smoking. When you choose a program, look for one that has proven success. Ask your doctor for ideas. You will greatly increase your chances of success if you take medicineas well as get counseling or join a cessation program.  Some of the changes you feel when you first quit tobacco are uncomfortable. Your body will miss the nicotine at first, and you may feel short-tempered and grumpy. You may have trouble sleeping or concentrating. Medicine can help you deal with these symptoms. You may struggle with changing your smoking habits and rituals. The last step is the tricky one: Be prepared for the smoking urge to continue for a time. This is a lot to deal with, but keep at it. You willfeel better. Follow-up care is a key part of your treatment and safety. Be sure to make and go to all appointments, and call your doctor if you are having problems. It's also a good idea to know your test results and keep alist of the medicines you take. How can you care for yourself at home?  Ask your family, friends, and coworkers for support. You have a better chance of quitting if you have help and support.    Join a support group, such as Nicotine Anonymous, for people who are trying to quit smoking.  Consider signing up for a smoking cessation program, such as the American Lung Association's Freedom from Smoking program.   Get text messaging support. Go to the website at www.smokefree. gov to sign up for the [facility] program.   Set a quit date. Pick your date carefully so that it is not right in the middle of a big deadline or stressful time. Once you quit, do not even take a puff. Get rid of all ashtrays and lighters after your last cigarette. Clean your house and your clothes so that they do not smell of smoke.  Learn how to be a nonsmoker. Think about ways you can avoid those things that make you reach for a cigarette. ? Avoid situations that put you at greatest risk for smoking. For some people, it is hard to have a drink with friends without smoking. For others, they might skip a coffee break with coworkers who smoke. ? Change your daily routine. Take a different route to work or eat a meal in a different place.  Cut down on stress. Calm yourself or release tension by doing an activity you enjoy, such as reading a book, taking a hot bath, or gardening.  Talk to your doctor or pharmacist about nicotine replacement therapy, which replaces the nicotine in your body. You still get nicotine but you do not use tobacco. Nicotine replacement products help you slowly reduce the amount of nicotine you need. These products come in several forms, many of them available over-the-counter:  ? Nicotine patches  ? Nicotine gum and lozenges  ? Nicotine inhaler   Ask your doctor about bupropion (Wellbutrin) or varenicline (Chantix), which are prescription medicines. They do not contain nicotine. They help you by reducing withdrawal symptoms, such as stress and anxiety.  Some people find hypnosis, acupuncture, and massage helpful for ending the smoking habit.  Eat a healthy diet and get regular exercise.  Having healthy habits will help your body move past its craving for nicotine.  Be prepared to keep trying. Most people are not successful the first few times they try to quit. Do not get mad at yourself if you smoke again. Make a list of things you learned and think about when you want to try again, such as next week, next month, or next year. Where can you learn more? Go to https://[employer]. org and sign in to your [employer] account. Enter A770 in the [employer] box to learn more about \"Stopping Smoking: Care Instructions. \"     If you do not have an account, please click on the \"Sign Up Now\" link. Current as of: October 28, 2021               Content Version: 13.2  © 2006-2022 [employer]wise[employer] Incorporated. Care instructions adapted under license by [facility] ([facility]). If you have questions about a medical condition or this instruction, always ask your healthcare professional. [address] any warranty or liability for your use of this information.

## 2022-07-07 ENCOUNTER — HOSPITAL ENCOUNTER (OUTPATIENT)
Age: 37
Discharge: HOME | End: 2022-07-07
Payer: COMMERCIAL

## 2022-07-07 ENCOUNTER — HOSPITAL ENCOUNTER (OUTPATIENT)
Dept: GENERAL RADIOLOGY | Age: 37
Discharge: HOME | End: 2022-07-07
Payer: COMMERCIAL

## 2022-07-07 DIAGNOSIS — R79.89 LOW TESTOSTERONE: ICD-10-CM

## 2022-07-07 DIAGNOSIS — R07.9 CHEST PAIN AT REST: ICD-10-CM

## 2022-07-07 PROCEDURE — 71046 X-RAY EXAM CHEST 2 VIEWS: CPT

## 2022-07-07 PROCEDURE — 84403 ASSAY OF TOTAL TESTOSTERONE: CPT

## 2022-07-07 PROCEDURE — 84270 ASSAY OF SEX HORMONE GLOBUL: CPT

## 2022-07-09 LAB
SEX HORMONE BINDING GLOBULIN: 27 NMOL/L (ref 11–80)
TESTOSTERONE FREE-NONMALE: 5.8 PG/ML (ref 47–244)
TESTOSTERONE TOTAL: 29 NG/DL (ref 220–1000)

## 2022-07-13 ENCOUNTER — PATIENT MESSAGE (OUTPATIENT)
Dept: PRIMARY CARE CLINIC | Age: 37
End: 2022-07-13

## 2022-07-13 DIAGNOSIS — R79.89 LOW TESTOSTERONE: Primary | ICD-10-CM

## 2022-07-13 NOTE — TELEPHONE ENCOUNTER
From: Jj Hunt  To: Dr. Jaydon Zuleta: 7/13/2022 4:02 PM EDT  Subject: Response to question about seeing an endocrinologist in the past    Good afternoon Doctor. I hope I'm sending this in the right place because I don't really use the [employer] rui and I'm trying to get more acquainted with it. I did see on the testosterone test comments, your question regarding if I have seen an endocrinologist in the past or have one that I prefer. I haven't seen an endocrinologist for anything before, and it would be my first time. Though judging by my test results , I do need to make an appointment with one. If you could please point me in the right direction with that as well as I believe I need a referral for an endocrinologist that accepts Shanika Hobbs. Thank you again for ordering the test, as now some of these symptoms finally are starting to make sense. If you could just reach out at your convenience, thank you so much for your time, and have a wonderful day!    Thanks,   Eduar Alcazar

## 2022-07-19 NOTE — TELEPHONE ENCOUNTER
Message was left for Maxwell Moore, to call his insurance company to find out which Endocrinologist they will cove and we will send a referral out to them.

## 2022-09-12 DIAGNOSIS — R00.2 PALPITATION: ICD-10-CM

## 2022-09-30 DIAGNOSIS — R07.2 PRECORDIAL PAIN: ICD-10-CM

## 2022-09-30 DIAGNOSIS — K21.00 GASTROESOPHAGEAL REFLUX DISEASE WITH ESOPHAGITIS WITHOUT HEMORRHAGE: ICD-10-CM

## 2022-09-30 RX ORDER — OMEPRAZOLE 20 MG/1
CAPSULE, DELAYED RELEASE ORAL
Qty: 90 CAPSULE | Refills: 1 | Status: SHIPPED | OUTPATIENT
Start: 2022-09-30

## 2022-09-30 NOTE — TELEPHONE ENCOUNTER
Medication:   Requested Prescriptions     Pending Prescriptions Disp Refills    omeprazole (PRILOSEC) 20 MG delayed release capsule [Pharmacy Med Name: OMEPRAZOLE DR 20 MG CAPSULE] 90 capsule 1     Sig: TAKE ONE CAPSULE BY MOUTH EVERY MORNING BEFORE BREAKFAST        Last Filled:  3/17/22    Patient Phone Number: [phone] (home)     Last appt: 6/8/2022   Next appt: Visit date not found    Last OARRS: No flowsheet data found.

## 2022-10-31 ENCOUNTER — TELEMEDICINE (OUTPATIENT)
Dept: ENDOCRINOLOGY | Age: 37
End: 2022-10-31
Payer: COMMERCIAL

## 2022-10-31 DIAGNOSIS — F33.1 MAJOR DEPRESSIVE DISORDER, RECURRENT EPISODE, MODERATE (HCC): Chronic | ICD-10-CM

## 2022-10-31 DIAGNOSIS — F11.23 OPIOID DEPENDENCE WITH WITHDRAWAL (HCC): ICD-10-CM

## 2022-10-31 DIAGNOSIS — F41.0 PANIC ATTACKS: Chronic | ICD-10-CM

## 2022-10-31 DIAGNOSIS — E29.1 HYPOGONADISM IN MALE: Primary | ICD-10-CM

## 2022-10-31 DIAGNOSIS — I10 BENIGN ESSENTIAL HTN: Chronic | ICD-10-CM

## 2022-10-31 DIAGNOSIS — K21.00 GASTROESOPHAGEAL REFLUX DISEASE WITH ESOPHAGITIS WITHOUT HEMORRHAGE: Chronic | ICD-10-CM

## 2022-10-31 PROCEDURE — G8427 DOCREV CUR MEDS BY ELIG CLIN: HCPCS | Performed by: INTERNAL MEDICINE

## 2022-10-31 PROCEDURE — 99203 OFFICE O/P NEW LOW 30 MIN: CPT | Performed by: INTERNAL MEDICINE

## 2022-10-31 RX ORDER — CLONAZEPAM 0.5 MG/1
TABLET ORAL 3 TIMES DAILY
COMMUNITY
Start: 2022-10-25

## 2022-10-31 NOTE — PROGRESS NOTES
Naina Peng is  referred here for evaluation and management of low Testosterone levels. Patient was diagnosed with hypogonadism in July 2022 . In last 2 years he has noticed significant fatigue , low energy and difficulty with lifting weight. Patient had complaints of erectile dysfunction and decreased strength which was worsening in the last few years. He took benzo for a long time as he couldn't take psych meds ( different meds were tried for anxiety and he notes improvement on opioids so there is a long history of opioid dependence since age 8. Initially opioids were given after his surgery)   He has bene on testosterone since July 2022 he has been taking 200 mg weekly. 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 He has no visual field defects. He has no anosmia. No history of pituitary disease. No breast discharge. Possible  trauma to his testes while working as a dancer . No history of mumps. No history of sleep apnea. He has no problems with fertility in the past and has children. He has no prominence of breast tissue. He has normal pubertal development. He has no history of microphallus. He has not noticed any changes in body hair distribution or decreased shaving frequency. He has noted muscular weakness in upper and lower extremities. He has no history of prostate problems. In 2010 had a normal testosterone in June 2010   He now weighs 180 lbs and has been working on weight loss.   Baseline weight was 140       Past Medical History:   Diagnosis Date    Anxiety     Benign essential HTN 12/18/2015    Episode of recurrent major depressive disorder ([facility] [address] 09/25/2018    Midline low back pain without sciatica 2016    Nasal polyp     Panic anxiety syndrome     Sedative, hypnotic or anxiolytic abuse, continuous ([facility] [address] 09/15/2018    Sleep disturbances      Past Surgical History:   Procedure Laterality Date    APPENDECTOMY      before 11years of age    BACK SURGERY      removed fragments of tailbone    SINUS SURGERY      TONSILLECTOMY      pre[age]      Social History     Socioeconomic History    Marital status: Single     Spouse name: Not on file    Number of children: Not on file    Years of education: Not on file    Highest education level: Not on file   Occupational History    Not on file   Tobacco Use    Smoking status: Every Day     Packs/day: 1.00     Years: 14.00     Pack years: 14.00     Types: Cigarettes    Smokeless tobacco: Never   Vaping Use    Vaping Use: Never used   Substance and Sexual Activity    Alcohol use: Not Currently    Drug use: Not Currently    Sexual activity: Yes     Partners: Male   Other Topics Concern    Not on file   Social History Narrative    Not on file     Social Determinants of Health     Financial Resource Strain: Unknown    Difficulty of Paying Living Expenses: Patient refused   Food Insecurity: Unknown    Worried About Running Out of Food in the Last Year: Patient refused    Ran Out of Food in the Last Year: Patient refused   Transportation Needs: Not on file   Physical Activity: Not on file   Stress: Not on file   Social Connections: Not on file   Intimate Partner Violence: Not on file   Housing Stability: Not on file     Family History   Problem Relation Age of Onset    Thyroid Disease Mother     Stroke Mother     Hypertension Mother     Drug Abuse Father 54         at 55-Oxycontin OD    Diabetes Father     No Known Problems Maternal Grandmother     No Known Problems Maternal Grandfather     No Known Problems Paternal Grandmother     No Known Problems Paternal Grandfather      Prior to Admission medications    Medication Sig Start Date End Date Taking?  Authorizing Provider   clonazePAM (KLONOPIN) 0.5 MG tablet in the morning, at noon, and at bedtime. 10/25/22  Yes Historical Provider, MD   Testosterone Cypionate 200 MG/ML KIT Inject into the muscle. 200 mg weekly   Yes Historical Provider, MD   omeprazole (PRILOSEC) 20 MG delayed release capsule TAKE ONE CAPSULE BY MOUTH EVERY MORNING BEFORE BREAKFAST 9/30/22  Yes Mike Olivas MD   metoprolol tartrate (LOPRESSOR) 25 MG tablet TAKE ONE TABLET BY MOUTH TWICE A DAY 9/13/22  Yes Mike Olivas MD   fluticasone (FLONASE) 50 MCG/ACT nasal spray SPRAY ONE SPRAY IN EACH NOSTRIL ONCE DAILY 3/16/22  Yes Cuba Hickey DO   hydrOXYzine (VISTARIL) 50 MG capsule Take 50 mg by mouth every 3 hours as needed   Yes Historical Provider, MD   acetaminophen (TYLENOL) 500 MG tablet Take 500 mg by mouth every 6 hours as needed   Yes Historical Provider, MD   buprenorphine-naloxone (SUBOXONE) 8-2 MG SUBL SL tablet 10 tablets. 6 mg daily 8/19/21  Yes Historical Provider, MD   nicotine (NICODERM CQ) 14 MG/24HR Place 1 patch onto the skin daily for 14 days  Patient not taking: Reported on 10/31/2022 6/8/22 6/22/22  Mike Olivas MD     Allergies   Allergen Reactions    Quetiapine      ortho stasis. Tramadol      Other reaction(s): Muscle Rigidity, Muscle Spasm        OBJECTIVE:  There were no vitals taken for this visit.        Constitutional: no acute distress, well appearing and well nourished  Psychiatric: oriented to person, place and time, judgement and insight and normal, recent and remote memory intact and mood and affect are normal  Skin: skin and subcutaneous tissue is normal without visible mass,   Head and Face: visual inspection  of head and face revealed no abnormalities  Eyes: visual inspection showed no lid or conjunctival swelling, erythema or discharge, pupils are normal, equal, round  Ears/Nose: external inspection of ears and nose revealed no abnormalities, hearing is grossly normal  Oropharynx/Mouth/Face: lips, tongue and gums appear  normal with no lesions, the voice quality was normal  Neck: neck appears symmetric, with no visible masses,   Pulmonary: no increased work of breathing or signs of respiratory distress,  Musculoskeletal: normal on inspection    Neurological: normal coordination and normal general cortical function      Assessment/Plan:    1. Hypogonadism in male most likely due to chronic opioid use  Patient is noted to have a decreased testosterone level of  on his blood work done in May 2022 in July 2022 with a testosterone level of 36 in May 2022, and a testosterone level of 29 in July 2022 --these readings were prior to him starting 200 mg of testosterone weekly dose. He feels significantly improved on the current dose of 200 mg of intramuscular testosterone injections weekly. He takes his injections on Saturdays. He was advised to get his blood work done so I can adjust the dose of his testosterone based on the blood work, patient will call back for results once he gets his blood work done in the next few days  His blood work before starting testosterone therapy did not include pituitary hormone levels, right now on the therapy those cannot be tested. Hypogonadism appears to be secondary in etiology. He has obesity and chronic opioid use since age 8 off and on. He was told that Testosterone replacement therapy can result in suppression of endogenous Testosterone production and decrease the chances of fertility. He was made aware of side effects of Testosterone therapy including gynecomastia, painful breasts, worsening prostate problems, increased hematocrit, and possible adverse effects on sleep apnea and lipid profile. Patient verbalized understanding and consented for treatment. - Testosterone; Future  - Hemoglobin and Hematocrit; Future  - DHEA-Sulfate; Future    2. Opioid dependence with withdrawal ([facility] [address]  Pt is currently in suboxone and follows with psychiatrist     3.  Major depressive disorder, recurrent episode, moderate (HCC)  Stable as per psychiatry    4. Benign essential HTN  Patient is on Lopressor    5. Gastroesophageal reflux disease with esophagitis without hemorrhage  Stable    6. Panic attacks  Follows with psychiatry      TELEHEALTH EVALUATION -- Audio/Visual (During RNYGR-82 public health emergency)  Pursuant to the emergency declaration under the [address] [place] [address] [facility] authority and the [employer] and [employer] Act, this Virtual  Visit was conducted, with patient's consent, to reduce the patient's risk of exposure to COVID-19 and provide care for  patient. Patient identification was verified and the caregiver was present when appropriate. The patient was located in the state where the provider is licensed to practice. The patient and/or guardian are aware that this is a billable service which includes applicable co-pays. This virtual/audio visit was conducted with patient and/or legal guardian's consent. Total time spent : 50+ min reviewing the chart, conducting an interview, performing a limited exam by video and educating the patient on my assessment plan.

## 2022-11-12 LAB
DHEAS (DHEA SULFATE): 86.4 UG/DL (ref 102.6–416.3)
HCT VFR BLD CALC: 43.4 % (ref 37.5–51)
HEMOGLOBIN: 14.8 G/DL (ref 13–17.7)
IRON SATURATION: 18 % (ref 15–55)
IRON: 67 UG/DL (ref 38–169)
PROSTATE SPECIFIC ANTIGEN: 1.1 NG/ML (ref 0–4)
TOTAL IRON BINDING CAPACITY: 364 UG/DL (ref 250–450)
UNSATURATED IRON BINDING CAPACITY: 297 UG/DL (ref 111–343)

## 2022-12-08 ENCOUNTER — OFFICE VISIT (OUTPATIENT)
Dept: PRIMARY CARE CLINIC | Age: 37
End: 2022-12-08
Payer: COMMERCIAL

## 2022-12-08 VITALS
BODY MASS INDEX: 29.73 KG/M2 | RESPIRATION RATE: 16 BRPM | HEART RATE: 88 BPM | OXYGEN SATURATION: 98 % | HEIGHT: 68 IN | DIASTOLIC BLOOD PRESSURE: 88 MMHG | WEIGHT: 196.2 LBS | SYSTOLIC BLOOD PRESSURE: 134 MMHG | TEMPERATURE: 98.2 F

## 2022-12-08 DIAGNOSIS — M54.9 OTHER CHRONIC BACK PAIN: Primary | ICD-10-CM

## 2022-12-08 DIAGNOSIS — R11.0 NAUSEA: ICD-10-CM

## 2022-12-08 DIAGNOSIS — G89.29 OTHER CHRONIC BACK PAIN: Primary | ICD-10-CM

## 2022-12-08 PROCEDURE — G8427 DOCREV CUR MEDS BY ELIG CLIN: HCPCS | Performed by: NURSE PRACTITIONER

## 2022-12-08 PROCEDURE — 3078F DIAST BP <80 MM HG: CPT | Performed by: NURSE PRACTITIONER

## 2022-12-08 PROCEDURE — G8417 CALC BMI ABV UP PARAM F/U: HCPCS | Performed by: NURSE PRACTITIONER

## 2022-12-08 PROCEDURE — 4004F PT TOBACCO SCREEN RCVD TLK: CPT | Performed by: NURSE PRACTITIONER

## 2022-12-08 PROCEDURE — G8484 FLU IMMUNIZE NO ADMIN: HCPCS | Performed by: NURSE PRACTITIONER

## 2022-12-08 PROCEDURE — 99215 OFFICE O/P EST HI 40 MIN: CPT | Performed by: NURSE PRACTITIONER

## 2022-12-08 PROCEDURE — 3074F SYST BP LT 130 MM HG: CPT | Performed by: NURSE PRACTITIONER

## 2022-12-08 RX ORDER — PROMETHAZINE HYDROCHLORIDE 12.5 MG/1
12.5 TABLET ORAL 3 TIMES DAILY PRN
Qty: 12 TABLET | Refills: 0 | Status: SHIPPED | OUTPATIENT
Start: 2022-12-08 | End: 2022-12-15

## 2022-12-08 SDOH — ECONOMIC STABILITY: FOOD INSECURITY: WITHIN THE PAST 12 MONTHS, YOU WORRIED THAT YOUR FOOD WOULD RUN OUT BEFORE YOU GOT MONEY TO BUY MORE.: NEVER TRUE

## 2022-12-08 SDOH — ECONOMIC STABILITY: FOOD INSECURITY: WITHIN THE PAST 12 MONTHS, THE FOOD YOU BOUGHT JUST DIDN'T LAST AND YOU DIDN'T HAVE MONEY TO GET MORE.: NEVER TRUE

## 2022-12-08 ASSESSMENT — ENCOUNTER SYMPTOMS
GASTROINTESTINAL NEGATIVE: 1
EYES NEGATIVE: 1
RESPIRATORY NEGATIVE: 1

## 2022-12-08 ASSESSMENT — SOCIAL DETERMINANTS OF HEALTH (SDOH): HOW HARD IS IT FOR YOU TO PAY FOR THE VERY BASICS LIKE FOOD, HOUSING, MEDICAL CARE, AND HEATING?: NOT HARD AT ALL

## 2022-12-08 NOTE — PROGRESS NOTES
Abdulaziz Easley (:  1985) is a 40 y.o. male,Established patient, here for evaluation of the following chief complaint(s):  Medication Check (Discuss rx for phenergan), Blood Work, and Referral - General (Discuss physical therapy )         ASSESSMENT/PLAN:  1. Other chronic back pain  -     [facility]  2. Nausea  -     promethazine (PHENERGAN) 12.5 MG tablet; Take 1 tablet by mouth 3 times daily as needed for Nausea, Disp-12 tablet, R-0Normal    No follow-ups on file. Subjective   SUBJECTIVE/OBJECTIVE:  HPI  Patient presents with back pain and has had some nausea. He had a back injury in the past and is currently on suboxone. C/o other providers judging him and would like to get physical therapy for his back and also wanted phenergan for nausea; he stated phenergan works better for his nausea than zofran. Review of Systems   Constitutional: Negative. HENT: Negative. Eyes: Negative. Respiratory: Negative. Cardiovascular: Negative. Gastrointestinal: Negative. Endocrine: Negative. Genitourinary:  Positive for urgency. Musculoskeletal:  Positive for back pain. Skin: Negative. Neurological: Negative. Hematological: Negative. Psychiatric/Behavioral: Negative. Objective   Physical Exam  HENT:      Right Ear: Tympanic membrane and ear canal normal.      Left Ear: Tympanic membrane and ear canal normal.      Nose: Nose normal.      Mouth/Throat:      Mouth: Mucous membranes are moist.   Eyes:      Extraocular Movements: Extraocular movements intact. Cardiovascular:      Rate and Rhythm: Normal rate. Pulses: Normal pulses. Heart sounds: Normal heart sounds. Pulmonary:      Effort: Pulmonary effort is normal.      Breath sounds: Normal breath sounds. Abdominal:      General: Bowel sounds are normal.      Palpations: Abdomen is soft. Musculoskeletal:         General: Normal range of motion.       Cervical back: Normal range of motion. Skin:     General: Skin is warm. Neurological:      General: No focal deficit present. Mental Status: He is alert and oriented to person, place, and time. Psychiatric:         Mood and Affect: Mood normal.         Behavior: Behavior normal.         Thought Content: Thought content normal.         Judgment: Judgment normal.          On this date 12/8/2022 I have spent 40 minutes reviewing previous notes, test results and face to face with the patient discussing the diagnosis and importance of compliance with the treatment plan as well as documenting on the day of the visit. An electronic signature was used to authenticate this note.     --Julian Servin, CHELSEA - CNP

## 2023-01-02 ASSESSMENT — ENCOUNTER SYMPTOMS: BACK PAIN: 1

## 2023-01-03 RX ORDER — ONDANSETRON 4 MG/1
TABLET, ORALLY DISINTEGRATING ORAL
Qty: 30 TABLET | Refills: 0 | Status: SHIPPED | OUTPATIENT
Start: 2023-01-03

## 2023-01-03 NOTE — TELEPHONE ENCOUNTER
Medication:   Requested Prescriptions     Pending Prescriptions Disp Refills    ondansetron (ZOFRAN-ODT) 4 MG disintegrating tablet [Pharmacy Med Name: ONDANSETRON ODT 4 MG TABLET] 30 tablet 0     Sig: DISSOLVE ONE TABLET BY MOUTH EVERY 12 HOURS AS NEEDED FOR NAUSEA OR VOMITING        Last Filled:  6/8/22    Patient Phone Number: [phone] (home)     Last appt: 12/8/2022   Next appt: Visit date not found    Last OARRS: No flowsheet data found.

## 2023-02-16 DIAGNOSIS — R07.2 PRECORDIAL PAIN: ICD-10-CM

## 2023-02-16 DIAGNOSIS — K21.00 GASTROESOPHAGEAL REFLUX DISEASE WITH ESOPHAGITIS WITHOUT HEMORRHAGE: ICD-10-CM

## 2023-02-17 NOTE — TELEPHONE ENCOUNTER
Medication:   Requested Prescriptions     Pending Prescriptions Disp Refills    omeprazole (PRILOSEC) 20 MG delayed release capsule [Pharmacy Med Name: OMEPRAZOLE DR 20 MG CAPSULE] 90 capsule 1     Sig: TAKE ONE CAPSULE BY MOUTH EVERY MORNING BEFORE BREAKFAST        Last Filled:  9/30/22    Patient Phone Number: [phone] (home)     Last appt: 12/8/2022   Next appt: Visit date not found    Last OARRS: No flowsheet data found.

## 2023-02-20 RX ORDER — OMEPRAZOLE 20 MG/1
CAPSULE, DELAYED RELEASE ORAL
Qty: 90 CAPSULE | Refills: 1 | Status: SHIPPED | OUTPATIENT
Start: 2023-02-20